# Patient Record
Sex: FEMALE | Race: WHITE | Employment: OTHER | URBAN - METROPOLITAN AREA
[De-identification: names, ages, dates, MRNs, and addresses within clinical notes are randomized per-mention and may not be internally consistent; named-entity substitution may affect disease eponyms.]

---

## 2017-08-16 ENCOUNTER — HOSPITAL ENCOUNTER (OUTPATIENT)
Dept: MAMMOGRAPHY | Facility: CLINIC | Age: 67
Discharge: HOME | End: 2017-08-16
Attending: OBSTETRICS & GYNECOLOGY | Admitting: OBSTETRICS & GYNECOLOGY
Payer: MEDICARE

## 2017-08-16 DIAGNOSIS — Z12.31 VISIT FOR SCREENING MAMMOGRAM: ICD-10-CM

## 2017-08-16 PROCEDURE — 77063 BREAST TOMOSYNTHESIS BI: CPT

## 2017-08-16 PROCEDURE — G0202 SCR MAMMO BI INCL CAD: HCPCS

## 2018-08-17 ENCOUNTER — HOSPITAL ENCOUNTER (OUTPATIENT)
Dept: MAMMOGRAPHY | Facility: CLINIC | Age: 68
Discharge: HOME | End: 2018-08-17
Attending: OBSTETRICS & GYNECOLOGY | Admitting: OBSTETRICS & GYNECOLOGY
Payer: MEDICARE

## 2018-08-17 DIAGNOSIS — Z12.31 VISIT FOR SCREENING MAMMOGRAM: ICD-10-CM

## 2018-08-17 PROCEDURE — 77067 SCR MAMMO BI INCL CAD: CPT

## 2019-08-19 ENCOUNTER — HOSPITAL ENCOUNTER (OUTPATIENT)
Dept: MAMMOGRAPHY | Facility: CLINIC | Age: 69
Discharge: HOME | End: 2019-08-19
Attending: OBSTETRICS & GYNECOLOGY | Admitting: OBSTETRICS & GYNECOLOGY
Payer: MEDICARE

## 2019-08-19 DIAGNOSIS — Z12.31 VISIT FOR SCREENING MAMMOGRAM: ICD-10-CM

## 2019-08-19 PROCEDURE — 77063 BREAST TOMOSYNTHESIS BI: CPT

## 2019-08-21 ENCOUNTER — TRANSFERRED RECORDS (OUTPATIENT)
Dept: HEALTH INFORMATION MANAGEMENT | Facility: CLINIC | Age: 69
End: 2019-08-21
Payer: MEDICARE

## 2020-09-02 ENCOUNTER — HOSPITAL ENCOUNTER (OUTPATIENT)
Dept: MAMMOGRAPHY | Facility: CLINIC | Age: 70
Discharge: HOME | End: 2020-09-02
Attending: OBSTETRICS & GYNECOLOGY | Admitting: OBSTETRICS & GYNECOLOGY
Payer: MEDICARE

## 2020-09-02 DIAGNOSIS — Z12.31 ENCOUNTER FOR SCREENING MAMMOGRAM FOR BREAST CANCER: ICD-10-CM

## 2020-09-02 PROCEDURE — 77067 SCR MAMMO BI INCL CAD: CPT

## 2021-01-15 ENCOUNTER — HEALTH MAINTENANCE LETTER (OUTPATIENT)
Age: 71
End: 2021-01-15

## 2021-08-09 LAB
GLUCOSE (EXTERNAL): 96 MG/DL (ref 70–110)
HBA1C MFR BLD: 5.4 % (ref 4.8–5.6)

## 2021-09-04 ENCOUNTER — HEALTH MAINTENANCE LETTER (OUTPATIENT)
Age: 71
End: 2021-09-04

## 2021-09-07 ENCOUNTER — HOSPITAL ENCOUNTER (OUTPATIENT)
Dept: MAMMOGRAPHY | Facility: CLINIC | Age: 71
Discharge: HOME | End: 2021-09-07
Attending: OBSTETRICS & GYNECOLOGY | Admitting: OBSTETRICS & GYNECOLOGY
Payer: MEDICARE

## 2021-09-07 DIAGNOSIS — Z12.31 VISIT FOR SCREENING MAMMOGRAM: ICD-10-CM

## 2021-09-07 PROCEDURE — 77063 BREAST TOMOSYNTHESIS BI: CPT

## 2021-09-15 ENCOUNTER — HOSPITAL ENCOUNTER (OUTPATIENT)
Dept: MAMMOGRAPHY | Facility: CLINIC | Age: 71
End: 2021-09-15
Attending: NURSE PRACTITIONER
Payer: MEDICARE

## 2021-09-15 DIAGNOSIS — R92.8 ABNORMAL MAMMOGRAM: ICD-10-CM

## 2021-09-15 PROCEDURE — 77061 BREAST TOMOSYNTHESIS UNI: CPT | Mod: LT

## 2021-09-15 PROCEDURE — 76642 ULTRASOUND BREAST LIMITED: CPT | Mod: LT

## 2021-10-11 ENCOUNTER — HOSPITAL ENCOUNTER (OUTPATIENT)
Dept: MAMMOGRAPHY | Facility: CLINIC | Age: 71
End: 2021-10-11
Attending: NURSE PRACTITIONER
Payer: MEDICARE

## 2021-10-11 DIAGNOSIS — R92.8 ABNORMAL MAMMOGRAM: ICD-10-CM

## 2021-10-11 PROCEDURE — 999N000065 MA POST PROCEDURE LEFT

## 2021-10-11 PROCEDURE — 88342 IMHCHEM/IMCYTCHM 1ST ANTB: CPT | Mod: TC,XU

## 2021-10-11 PROCEDURE — 272N000031 US BREAST BIOPSY CORE NEEDLE LEFT

## 2021-10-11 PROCEDURE — 250N000009 HC RX 250

## 2021-10-11 RX ORDER — HYDROXYZINE HYDROCHLORIDE 25 MG/1
TABLET, FILM COATED ORAL
COMMUNITY
Start: 2021-09-07 | End: 2021-11-03

## 2021-10-11 RX ORDER — PREDNISONE 20 MG/1
TABLET ORAL
COMMUNITY
Start: 2021-07-08 | End: 2021-11-03

## 2021-10-11 RX ORDER — CITALOPRAM HYDROBROMIDE 20 MG/1
20 TABLET ORAL DAILY
COMMUNITY
Start: 2021-09-23

## 2021-10-11 RX ORDER — TRAZODONE HYDROCHLORIDE 50 MG/1
TABLET, FILM COATED ORAL
COMMUNITY
Start: 2021-09-07 | End: 2021-11-15

## 2021-10-11 RX ORDER — DOXYCYCLINE HYCLATE 100 MG
TABLET ORAL
COMMUNITY
Start: 2021-08-02 | End: 2021-11-03

## 2021-10-11 RX ORDER — ROSUVASTATIN CALCIUM 5 MG/1
5 TABLET, COATED ORAL DAILY
COMMUNITY
Start: 2021-09-04

## 2021-10-11 RX ORDER — SUMATRIPTAN 100 MG/1
100 TABLET, FILM COATED ORAL
COMMUNITY
Start: 2021-08-04

## 2021-10-11 RX ADMIN — LIDOCAINE HYDROCHLORIDE 5 ML: 10 INJECTION, SOLUTION INFILTRATION; PERINEURAL at 10:00

## 2021-10-11 NOTE — DISCHARGE INSTRUCTIONS

## 2021-10-13 LAB
PATH REPORT.COMMENTS IMP SPEC: NORMAL
PATH REPORT.FINAL DX SPEC: NORMAL
PATH REPORT.GROSS SPEC: NORMAL
PATH REPORT.MICROSCOPIC SPEC OTHER STN: NORMAL
PATH REPORT.MICROSCOPIC SPEC OTHER STN: NORMAL
PATH REPORT.RELEVANT HX SPEC: NORMAL
PHOTO IMAGE: NORMAL

## 2021-10-13 PROCEDURE — 88342 IMHCHEM/IMCYTCHM 1ST ANTB: CPT | Mod: 26 | Performed by: PATHOLOGY

## 2021-10-13 PROCEDURE — 88305 TISSUE EXAM BY PATHOLOGIST: CPT | Mod: 26 | Performed by: PATHOLOGY

## 2021-10-13 PROCEDURE — 88341 IMHCHEM/IMCYTCHM EA ADD ANTB: CPT | Mod: 26 | Performed by: PATHOLOGY

## 2021-10-14 NOTE — PROGRESS NOTES
Atypical Path:  Pathology report reviewed with our breast radiologist Dr. Raymond Elaine, who confirmed the recent breast imaging is concordant with the final surgical pathology(10/11/21) results below.    I phoned Ms. Lydia Ac, confirmed her full name, date of birth, and notified patient of Ultrasound Guided Left Breast Biopsy results showing Complex Sclerosing Lesion and Atypical Ductal Hyperplasia(ADH).     Patient states no problems with biopsy site.  Recommended follow up is Surgical Consultation.  Patient states she will discuss with her OB/GYN, and then call Dr. Jennifer Rodriguez to schedule her surgical consult.     Questions were answered and she has my phone number if she has further questions.  Patient will call me back to inform once she is scheduled for Surgical Consultation with Dr. Rodriguez.     Patient verbalized understanding and agrees with the plan of care.  Ordering provider- Eliane Nguyen CNP has been notified of the results, and recommendations for follow up.  I will forward this note, along with the pathology results.   Farideh Gutierrez RN, BSN    Farideh Gutierrez RN, BSN  Breast Care Nurse Coordinator  [facility] [place]  [phone]      Lydia Ac [id number]  F, 1950  Surgical Pathology Report (Final result) [id number]  Authorizing Provider: Non-Fv Credentialed Provider,  Radiology  Ordering Provider: Non-Fv Credentialed Provider,  Radiology  Ordering Location: [facility]  Collected: 10/11/2021 10:00 AM  Pathologist: Kong Hannon MD Received: 10/11/2021 01:05 PM  .  Specimens  ID  Evan  ID Source Description Protocol Removed  A A Breast, Left Breast, Left Breast needle core biopsy  .  Final Diagnosis  A. Left breast needle biopsy:  - Complex sclerosing lesion with atypical ductal hyperplasia, usual type ductal hyperplasia, columnar cell change and  adenosis with focal  areas of microcalcifications.  Electronically signed by Kong Hannon MD on 10/13/2021 at 4:06 PM

## 2021-11-03 ENCOUNTER — OFFICE VISIT (OUTPATIENT)
Dept: SURGERY | Facility: CLINIC | Age: 71
End: 2021-11-03
Payer: MEDICARE

## 2021-11-03 ENCOUNTER — TELEPHONE (OUTPATIENT)
Dept: SURGERY | Facility: CLINIC | Age: 71
End: 2021-11-03

## 2021-11-03 VITALS
OXYGEN SATURATION: 98 % | SYSTOLIC BLOOD PRESSURE: 135 MMHG | RESPIRATION RATE: 16 BRPM | DIASTOLIC BLOOD PRESSURE: 77 MMHG | BODY MASS INDEX: 29.89 KG/M2 | HEART RATE: 78 BPM | HEIGHT: 66 IN | WEIGHT: 186 LBS

## 2021-11-03 DIAGNOSIS — N60.99 ATYPICAL DUCTAL HYPERPLASIA OF BREAST: Primary | ICD-10-CM

## 2021-11-03 DIAGNOSIS — Z11.59 ENCOUNTER FOR SCREENING FOR OTHER VIRAL DISEASES: ICD-10-CM

## 2021-11-03 PROCEDURE — 99204 OFFICE O/P NEW MOD 45 MIN: CPT | Performed by: SURGERY

## 2021-11-03 ASSESSMENT — MIFFLIN-ST. JEOR: SCORE: 1380.44

## 2021-11-03 NOTE — PROGRESS NOTES
Breast Patients      BREAST PATIENTS (FEMALE)    At what age did your periods begin? 14    What was the date of your last menstrual period? 30yrs ago    Have you begun menopause? Yes  Age Menopause began:  51    Are you using hormone replacement therapy?  No    Number of full-term pregnancies: 3    Did you nurse your children? Yes    Are you pregnant now? No    Do you have breast implants? No         BREAST PATIENTS (ALL)    Have you had a previous breast biopsy? Yes  Side: R  Date: 11/2009    Have you had previous Breast Cancer? No

## 2021-11-03 NOTE — PROGRESS NOTES
HPI:  Lydia Ac is a 71 year old female referred to me by Dr Clarke for consultation regarding a new left breast mammographic finding.  Lydia had an abnormality found on a routine screening mammogram.  She reports that the finding is non-palpable since it was first discovered.   She reports no symptoms in either the affected breast or in the contralateral, right breast.      Her risk factors for breast cancer include:  Personal history of atypical ductal hyperplasia in the right breast in 2009.  Positive family history in mother  at age 70s.  Maternal aunt and grandmother as with breast cancer in their later years.  No hormone therapy done for prior atypical ductal hyperplasia diagnosis.      Past Medical History:   has no past medical history on file.     Smoking History:  has never smoked.      Past Surgical History:  Past Surgical History:   Procedure Laterality Date     BREAST SURGERY          ROS:  10 point ROS is negative except as stated in the History of the Present Illness and joint pain and headache.    PE:  Vitals: There were no vitals taken for this visit.  General appearance: well-nourished, sitting comfortably, no apparent distress  Neck: Supple   Respirations:  Unlabored  Extremities: Without edema  Neurologic: alert, speech is clear, moves all extremities with good strength  Psychiatric: Mood and affect are appropriate  Skin: Without lesions or rashes  Breast:   Appearance-  Symmetrical with no skin or nipple changes.  Contour is normal.  Fibrocystic tissue is mild and symmetrical.  Masses- none   Lymph:   No supraclavicular/infraclavicular adenopathy.                   Axilla: Palpable adenopathy: Right - None                                                                Left - None  Imaging:  All films personally reviewed with Ldyia   Mammography reveals:  Scattered density.  Left breast with an ill defined architectural distortion in theleft breast, 12:00, mid depth.    US reveals:     Targeted ultrasound evaluation of the left breast at 12:00 2 cm from  the nipple demonstrates an irregular hypoechoic area measuring 0.8 x  1.0 x 0.7 cm, corresponding with the mammographic finding.     Pathology:  Percutaneous core needle biopsy reveals:   A.  Left breast needle biopsy:  - Complex sclerosing lesion with atypical ductal hyperplasia, usual type ductal hyperplasia, columnar cell change and adenosis with focal areas of microcalcifications..      Assessment:     Lydia is a 71 year old female with a Left breast asymmetrical density at  12 o'clock and 2 cm from the nipple.  This is biopsy proven atypical ductal hyperplasia with a sclerosing lesion    PLAN:  Recommend seed localized breast biopsy to ensure we are not missing anything adjacent to the finding.      Jennifer Rodriguez MD    Please route or send letter to:  Primary Care Provider (PCP) and Referring Provider

## 2021-11-03 NOTE — LETTER
November 3, 2021    RE: Lydia Ac, : 1950      HPI:  Lydia Ac is a 71 year old female referred to me by Dr Clarke for consultation regarding a new left breast mammographic finding.  Lydia had an abnormality found on a routine screening mammogram.  She reports that the finding is non-palpable since it was first discovered.   She reports no symptoms in either the affected breast or in the contralateral, right breast.       Her risk factors for breast cancer include:  Personal history of atypical ductal hyperplasia in the right breast in .  Positive family history in mother  at age 70s.  Maternal aunt and grandmother as with breast cancer in their later years.  No hormone therapy done for prior atypical ductal hyperplasia diagnosis.      Past Medical History:  Has no past medical history on file.      Smoking History:  Has never smoked.      ROS:  10 point ROS is negative except as stated in the History of the Present Illness and joint pain and headache.     PE:  Vitals: There were no vitals taken for this visit.  General appearance: well-nourished, sitting comfortably, no apparent distress  Neck: Supple   Respirations:  Unlabored  Extremities: Without edema  Neurologic: alert, speech is clear, moves all extremities with good strength  Psychiatric: Mood and affect are appropriate  Skin: Without lesions or rashes  Breast:   Appearance-  Symmetrical with no skin or nipple changes.  Contour is normal.  Fibrocystic tissue is mild and symmetrical.  Masses- none   Lymph:   No supraclavicular/infraclavicular adenopathy.                   Axilla: Palpable adenopathy: Right - None                                                                Left - None  Imaging:  All films personally reviewed with Lydia   Mammography reveals:  Scattered density.  Left breast with an ill defined architectural distortion in theleft breast, 12:00, mid depth.     US reveals:    Targeted ultrasound evaluation of the  left breast at 12:00 2 cm from  the nipple demonstrates an irregular hypoechoic area measuring 0.8 x  1.0 x 0.7 cm, corresponding with the mammographic finding.      Pathology:  Percutaneous core needle biopsy reveals:   A.  Left breast needle biopsy:  - Complex sclerosing lesion with atypical ductal hyperplasia, usual type ductal hyperplasia, columnar cell change and adenosis with focal areas of microcalcifications..       Assessment:     Lydia is a 71 year old female with a Left breast asymmetrical density at  12 o'clock and 2 cm from the nipple.  This is biopsy proven atypical ductal hyperplasia with a sclerosing lesion     PLAN:  Recommend seed localized breast biopsy to ensure we are not missing anything adjacent to the finding.           Jennifer Rodriguez MD

## 2021-11-03 NOTE — TELEPHONE ENCOUNTER
Type of surgery: LEFT BREAST SEED LOCALIZED EXCISIONAL BIOPSY   Location of surgery: [facility]  Date and time of surgery: 11-22-21, 12 PM   Surgeon: DR. REYES   Pre-Op Appt Date:   Post-Op Appt Date:    Packet sent out: GIVEN TO PATIENT   Pre-cert/Authorization completed:  Not Applicable  Date: 11-3-21        LEFT BREAST SEED LOCALIZED EXCISIONAL BIOPSY  MAC   PT INST TO HAVE H&P WITH DR. GUTHRIE  60 MINS REQ  PA ASSIST NLG  ALW   L seed loc at 10 am  alw

## 2021-11-15 RX ORDER — TRAZODONE HYDROCHLORIDE 50 MG/1
50 TABLET, FILM COATED ORAL AT BEDTIME
COMMUNITY
End: 2024-06-25

## 2021-11-15 ASSESSMENT — MIFFLIN-ST. JEOR: SCORE: 1271.58

## 2021-11-19 ENCOUNTER — LAB (OUTPATIENT)
Dept: URGENT CARE | Facility: URGENT CARE | Age: 71
End: 2021-11-19
Attending: SURGERY
Payer: MEDICARE

## 2021-11-19 DIAGNOSIS — Z11.59 ENCOUNTER FOR SCREENING FOR OTHER VIRAL DISEASES: ICD-10-CM

## 2021-11-19 LAB — SARS-COV-2 RNA RESP QL NAA+PROBE: NEGATIVE

## 2021-11-19 PROCEDURE — U0005 INFEC AGEN DETEC AMPLI PROBE: HCPCS

## 2021-11-19 PROCEDURE — U0003 INFECTIOUS AGENT DETECTION BY NUCLEIC ACID (DNA OR RNA); SEVERE ACUTE RESPIRATORY SYNDROME CORONAVIRUS 2 (SARS-COV-2) (CORONAVIRUS DISEASE [COVID-19]), AMPLIFIED PROBE TECHNIQUE, MAKING USE OF HIGH THROUGHPUT TECHNOLOGIES AS DESCRIBED BY CMS-2020-01-R: HCPCS

## 2021-11-19 ASSESSMENT — MIFFLIN-ST. JEOR: SCORE: 1269.32

## 2021-11-22 ENCOUNTER — APPOINTMENT (OUTPATIENT)
Dept: MAMMOGRAPHY | Facility: CLINIC | Age: 71
End: 2021-11-22
Attending: SURGERY
Payer: MEDICARE

## 2021-11-22 ENCOUNTER — APPOINTMENT (OUTPATIENT)
Dept: SURGERY | Facility: PHYSICIAN GROUP | Age: 71
End: 2021-11-22
Payer: MEDICARE

## 2021-11-22 ENCOUNTER — ANESTHESIA EVENT (OUTPATIENT)
Dept: SURGERY | Facility: CLINIC | Age: 71
End: 2021-11-22
Payer: MEDICARE

## 2021-11-22 ENCOUNTER — HOSPITAL ENCOUNTER (OUTPATIENT)
Facility: CLINIC | Age: 71
Discharge: HOME | End: 2021-11-22
Attending: SURGERY | Admitting: SURGERY
Payer: MEDICARE

## 2021-11-22 ENCOUNTER — HOSPITAL ENCOUNTER (OUTPATIENT)
Dept: ULTRASOUND IMAGING | Facility: CLINIC | Age: 71
End: 2021-11-22
Attending: SURGERY | Admitting: SURGERY
Payer: MEDICARE

## 2021-11-22 ENCOUNTER — ANESTHESIA (OUTPATIENT)
Dept: SURGERY | Facility: CLINIC | Age: 71
End: 2021-11-22
Payer: MEDICARE

## 2021-11-22 ENCOUNTER — HOSPITAL ENCOUNTER (OUTPATIENT)
Dept: MAMMOGRAPHY | Facility: CLINIC | Age: 71
End: 2021-11-22
Attending: SURGERY | Admitting: SURGERY
Payer: MEDICARE

## 2021-11-22 VITALS
DIASTOLIC BLOOD PRESSURE: 80 MMHG | OXYGEN SATURATION: 99 % | TEMPERATURE: 97.3 F | BODY MASS INDEX: 33.47 KG/M2 | RESPIRATION RATE: 16 BRPM | WEIGHT: 177.3 LBS | HEART RATE: 62 BPM | HEIGHT: 61 IN | SYSTOLIC BLOOD PRESSURE: 130 MMHG

## 2021-11-22 DIAGNOSIS — N60.99 ATYPICAL DUCTAL HYPERPLASIA OF BREAST: ICD-10-CM

## 2021-11-22 PROCEDURE — 999N000104 MA BREAST SPECIMEN LEFT OR

## 2021-11-22 PROCEDURE — 370N000017 HC ANESTHESIA TECHNICAL FEE, PER MIN: Performed by: SURGERY

## 2021-11-22 PROCEDURE — 999N000065 MA POST PROCEDURE LEFT

## 2021-11-22 PROCEDURE — 88360 TUMOR IMMUNOHISTOCHEM/MANUAL: CPT | Mod: TC | Performed by: SURGERY

## 2021-11-22 PROCEDURE — 272N000001 HC OR GENERAL SUPPLY STERILE: Performed by: SURGERY

## 2021-11-22 PROCEDURE — 250N000011 HC RX IP 250 OP 636: Performed by: SURGERY

## 2021-11-22 PROCEDURE — 258N000003 HC RX IP 258 OP 636: Performed by: ANESTHESIOLOGY

## 2021-11-22 PROCEDURE — 250N000009 HC RX 250: Performed by: RADIOLOGY

## 2021-11-22 PROCEDURE — 250N000011 HC RX IP 250 OP 636: Performed by: NURSE ANESTHETIST, CERTIFIED REGISTERED

## 2021-11-22 PROCEDURE — 250N000009 HC RX 250: Performed by: SURGERY

## 2021-11-22 PROCEDURE — A4648 IMPLANTABLE TISSUE MARKER: HCPCS

## 2021-11-22 PROCEDURE — 19285 PERQ DEV BREAST 1ST US IMAG: CPT | Mod: LT

## 2021-11-22 PROCEDURE — 710N000012 HC RECOVERY PHASE 2, PER MINUTE: Performed by: SURGERY

## 2021-11-22 PROCEDURE — 360N000083 HC SURGERY LEVEL 3 W/ FLUORO, PER MIN: Performed by: SURGERY

## 2021-11-22 PROCEDURE — 999N000141 HC STATISTIC PRE-PROCEDURE NURSING ASSESSMENT: Performed by: SURGERY

## 2021-11-22 PROCEDURE — 19125 EXCISION BREAST LESION: CPT | Mod: LT | Performed by: SURGERY

## 2021-11-22 PROCEDURE — 250N000009 HC RX 250: Performed by: NURSE ANESTHETIST, CERTIFIED REGISTERED

## 2021-11-22 PROCEDURE — 88342 IMHCHEM/IMCYTCHM 1ST ANTB: CPT | Mod: TC,XU | Performed by: SURGERY

## 2021-11-22 RX ORDER — HYDROCODONE BITARTRATE AND ACETAMINOPHEN 5; 325 MG/1; MG/1
1-2 TABLET ORAL
Status: DISCONTINUED | OUTPATIENT
Start: 2021-11-22 | End: 2021-11-22 | Stop reason: HOSPADM

## 2021-11-22 RX ORDER — FENTANYL CITRATE 50 UG/ML
INJECTION, SOLUTION INTRAMUSCULAR; INTRAVENOUS PRN
Status: DISCONTINUED | OUTPATIENT
Start: 2021-11-22 | End: 2021-11-22

## 2021-11-22 RX ORDER — HYDROCODONE BITARTRATE AND ACETAMINOPHEN 5; 325 MG/1; MG/1
1-2 TABLET ORAL EVERY 4 HOURS PRN
Qty: 15 TABLET | Refills: 0 | Status: SHIPPED | OUTPATIENT
Start: 2021-11-22 | End: 2021-12-13

## 2021-11-22 RX ORDER — LIDOCAINE HYDROCHLORIDE 10 MG/ML
INJECTION, SOLUTION INFILTRATION; PERINEURAL PRN
Status: DISCONTINUED | OUTPATIENT
Start: 2021-11-22 | End: 2021-11-22

## 2021-11-22 RX ORDER — ONDANSETRON 2 MG/ML
INJECTION INTRAMUSCULAR; INTRAVENOUS PRN
Status: DISCONTINUED | OUTPATIENT
Start: 2021-11-22 | End: 2021-11-22

## 2021-11-22 RX ORDER — CEFAZOLIN SODIUM/WATER 2 G/20 ML
2 SYRINGE (ML) INTRAVENOUS SEE ADMIN INSTRUCTIONS
Status: DISCONTINUED | OUTPATIENT
Start: 2021-11-22 | End: 2021-11-22 | Stop reason: HOSPADM

## 2021-11-22 RX ORDER — PROPOFOL 10 MG/ML
INJECTION, EMULSION INTRAVENOUS CONTINUOUS PRN
Status: DISCONTINUED | OUTPATIENT
Start: 2021-11-22 | End: 2021-11-22

## 2021-11-22 RX ORDER — CEFAZOLIN SODIUM/WATER 2 G/20 ML
2 SYRINGE (ML) INTRAVENOUS
Status: COMPLETED | OUTPATIENT
Start: 2021-11-22 | End: 2021-11-22

## 2021-11-22 RX ORDER — MAGNESIUM HYDROXIDE 1200 MG/15ML
LIQUID ORAL PRN
Status: DISCONTINUED | OUTPATIENT
Start: 2021-11-22 | End: 2021-11-22 | Stop reason: HOSPADM

## 2021-11-22 RX ORDER — SODIUM CHLORIDE, SODIUM LACTATE, POTASSIUM CHLORIDE, CALCIUM CHLORIDE 600; 310; 30; 20 MG/100ML; MG/100ML; MG/100ML; MG/100ML
INJECTION, SOLUTION INTRAVENOUS CONTINUOUS
Status: DISCONTINUED | OUTPATIENT
Start: 2021-11-22 | End: 2021-11-22 | Stop reason: HOSPADM

## 2021-11-22 RX ORDER — LIDOCAINE 40 MG/G
CREAM TOPICAL
Status: DISCONTINUED | OUTPATIENT
Start: 2021-11-22 | End: 2021-11-22 | Stop reason: HOSPADM

## 2021-11-22 RX ADMIN — Medication 2 G: at 13:07

## 2021-11-22 RX ADMIN — LIDOCAINE HYDROCHLORIDE 5 ML: 10 INJECTION, SOLUTION EPIDURAL; INFILTRATION; INTRACAUDAL; PERINEURAL at 10:26

## 2021-11-22 RX ADMIN — FENTANYL CITRATE 50 MCG: 50 INJECTION, SOLUTION INTRAMUSCULAR; INTRAVENOUS at 13:46

## 2021-11-22 RX ADMIN — ONDANSETRON HYDROCHLORIDE 4 MG: 2 INJECTION, SOLUTION INTRAVENOUS at 13:50

## 2021-11-22 RX ADMIN — LIDOCAINE HYDROCHLORIDE 20 MG: 10 INJECTION, SOLUTION INFILTRATION; PERINEURAL at 13:15

## 2021-11-22 RX ADMIN — SODIUM CHLORIDE, POTASSIUM CHLORIDE, SODIUM LACTATE AND CALCIUM CHLORIDE: 600; 310; 30; 20 INJECTION, SOLUTION INTRAVENOUS at 09:23

## 2021-11-22 RX ADMIN — PROPOFOL 200 MCG/KG/MIN: 10 INJECTION, EMULSION INTRAVENOUS at 13:13

## 2021-11-22 RX ADMIN — FENTANYL CITRATE 50 MCG: 50 INJECTION, SOLUTION INTRAMUSCULAR; INTRAVENOUS at 13:17

## 2021-11-22 ASSESSMENT — MIFFLIN-ST. JEOR: SCORE: 1261.61

## 2021-11-22 NOTE — ANESTHESIA PREPROCEDURE EVALUATION
Anesthesia Pre-Procedure Evaluation    Patient: Lydia Ac   MRN: [id number] : 1950        Preoperative Diagnosis: Atypical ductal hyperplasia of breast [N60.99]    Procedure : Procedure(s):  LUMPECTOMY, BREAST, WITH RADIOACTIVE SEED LOCALIZATION left          Past Medical History:   Diagnosis Date     Anxiety      H/O hyperlipidemia      Hx of migraine headaches      Left bundle branch block       Past Surgical History:   Procedure Laterality Date     BREAST SURGERY      biopsy x3 (benign)     ORTHOPEDIC SURGERY Left     bunionectomy     right rotator cuff repair        No Known Allergies   Social History     Tobacco Use     Smoking status: Never Smoker     Smokeless tobacco: Never Used   Substance Use Topics     Alcohol use: Yes     Comment: occasional       Wt Readings from Last 1 Encounters:   21 80.4 kg (177 lb 4.8 oz)        Anesthesia Evaluation   Pt has had prior anesthetic. Type: General.    No history of anesthetic complications       ROS/MED HX  ENT/Pulmonary:  - neg pulmonary ROS     Neurologic:  - neg neurologic ROS     Cardiovascular:  - neg cardiovascular ROS   (+) Dyslipidemia -----    METS/Exercise Tolerance:     Hematologic:  - neg hematologic  ROS     Musculoskeletal:   (+) arthritis,     GI/Hepatic:  - neg GI/hepatic ROS     Renal/Genitourinary:  - neg Renal ROS     Endo:  - neg endo ROS     Psychiatric/Substance Use:     (+) psychiatric history anxiety     Infectious Disease:  - neg infectious disease ROS     Malignancy:       Other:            Physical Exam    Airway        Mallampati: II   TM distance: > 3 FB   Neck ROM: full   Mouth opening: > 3 cm    Respiratory Devices and Support         Dental       (+) missing      Cardiovascular   cardiovascular exam normal          Pulmonary   pulmonary exam normal                OUTSIDE LABS:  CBC: No results found for: WBC, HGB, HCT, PLT  BMP: No results found for: NA, POTASSIUM, CHLORIDE, CO2, BUN, CR, GLC  COAGS: No results  found for: PTT, INR, FIBR  POC: No results found for: BGM, HCG, HCGS  HEPATIC: No results found for: ALBUMIN, PROTTOTAL, ALT, AST, GGT, ALKPHOS, BILITOTAL, BILIDIRECT, ACE  OTHER: No results found for: PH, LACT, A1C, WALLY, PHOS, MAG, LIPASE, AMYLASE, TSH, T4, T3, CRP, SED    Anesthesia Plan    ASA Status:  2      Anesthesia Type: MAC.     - Reason for MAC: straight local not clinically adequate              Consents    Anesthesia Plan(s) and associated risks, benefits, and realistic alternatives discussed. Questions answered and patient/representative(s) expressed understanding.    - Discussed:     - Discussed with:  Patient      - Extended Intubation/Ventilatory Support Discussed: No.      - Patient is DNR/DNI Status: No    Use of blood products discussed: No .     Postoperative Care    Pain management: Oral pain medications, IV analgesics.   PONV prophylaxis: Ondansetron (or other 5HT-3), Dexamethasone or Solumedrol     Comments:                Morales Dumont MD

## 2021-11-22 NOTE — ANESTHESIA POSTPROCEDURE EVALUATION
Patient: Lydia Ac    Procedure: Procedure(s):  LUMPECTOMY, BREAST, WITH RADIOACTIVE SEED LOCALIZATION left       Diagnosis:Atypical ductal hyperplasia of breast [N60.99]  Diagnosis Additional Information: No value filed.    Anesthesia Type:  MAC    Note:  Disposition: Outpatient   Postop Pain Control: Uneventful            Sign Out: Well controlled pain   PONV: No   Neuro/Psych: Uneventful            Sign Out: Acceptable/Baseline neuro status   Airway/Respiratory: Uneventful            Sign Out: Acceptable/Baseline resp. status   CV/Hemodynamics: Uneventful            Sign Out: Acceptable CV status; No obvious hypovolemia; No obvious fluid overload   Other NRE: NONE   DID A NON-ROUTINE EVENT OCCUR? No           Last vitals:  Vitals Value Taken Time   /63 11/22/21 1400   Temp 97.3  F (36.3  C) 11/22/21 1400   Pulse 65 11/22/21 1400   Resp 16 11/22/21 1400   SpO2 95 % 11/22/21 1400       Electronically Signed By: Morales Dumont MD  November 22, 2021  2:26 PM

## 2021-11-22 NOTE — ANESTHESIA CARE TRANSFER NOTE
Patient: Lydia Ac    Procedure: Procedure(s):  LUMPECTOMY, BREAST, WITH RADIOACTIVE SEED LOCALIZATION left       Diagnosis: Atypical ductal hyperplasia of breast [N60.99]  Diagnosis Additional Information: No value filed.    Anesthesia Type:   MAC     Note:    Oropharynx: oropharynx clear of all foreign objects  Level of Consciousness: awake  Oxygen Supplementation: room air    Independent Airway: airway patency satisfactory and stable  Dentition: dentition unchanged  Vital Signs Stable: post-procedure vital signs reviewed and stable  Report to RN Given: handoff report given  Patient transferred to: Phase II    Handoff Report: Identifed the Patient, Identified the Reponsible Provider, Reviewed the pertinent medical history, Discussed the surgical course, Reviewed Intra-OP anesthesia mangement and issues during anesthesia, Set expectations for post-procedure period and Allowed opportunity for questions and acknowledgement of understanding      Vitals:  Vitals Value Taken Time   BP     Temp     Pulse     Resp     SpO2         Electronically Signed By: AVELINO Munoz CRNA  November 22, 2021  2:02 PM  
No

## 2021-11-22 NOTE — OP NOTE
PREOPERATIVE DIAGNOSIS: Left breast atypical ductal hyperplasia and complex sclerosing lesion.   POSTOPERATIVE DIAGNOSIS: Left breast atypical ductal hyperplasia and complex sclerosing lesion.   PROCEDURE: Left excisional breast biopsy with seed localization.   ANESTHESIA: Local with MAC.   PREOPERATIVE MEDICATIONS: Ancef 2 gm IV.   SURGEON: Jennifer Rodriguez MD   ASSISTANT: Mahnaz Philip PA-C  - the physician assistant was medically necessary in providing adequate exposure in the operating field, maintaining hemostasis, cutting suture, clamping and ligating blood vessels, and visualization of the anatomic structures throughout the surgical procedure.   INDICATIONS: Lydia Ac is a 71 year old female was found to have a left breast mammographic finding on recent mammography. The area has been biopsied and shows atypical ductal hyperplasia.  Wider excision is recommended to ensure no adjacent more concerning finding.  The localization seed is adjacent to the lesion and clip.  She presents now for excisional biopsy.   PROCEDURE: The patient was placed supine. Left breast prepped and draped in usual sterile fashion. Local anesthetic was obtained with 1% lidocaine and 0.25% Marcaine plain. Curvilinear incision was made directly over the area of increased counts.  The dissection was deepened, using the radioactive seed to determine the direction of the dissection.  Once we are around and deep to the area of increased counts, the specimen is removed from the breast.  The gamma probe confirms that the seed is now in the specimen and not the breast.  Grossly, the specimen is consistent with dense breast tissue. Palpation through the incision site found the remaining tissue to be  palpably normal. The site was then inspected for hemostasis, irrigated and closed using 3-0 subcuticular Monocryl. Sterile dressings were applied and the patient transferred to recovery in good condition.   ESTIMATED BLOOD LOSS: Less than  10 cc.   INTRAOPERATIVE FINDINGS: Specimen excised by specimen mammogram, pathology pending.   CHRISTOPHER REYES MD

## 2021-11-22 NOTE — PROGRESS NOTES
SBAR Seed Localization    SITUATION:  Patient to breast imaging center for imaging guided seed localizations before breast lumpectomy or excision biopsy without sentinel node injection.    BACKGROUND:  Breast imaging cancer, breast abnormality  Ordered procedure completed: Yes  Special needs identified: No     ASSESSMENT:  SBAR report called to patient care unit because of unexpected event in radiology: No  Allergies and medication list reviewed prior to procedure. Yes  Skin cleansed with ChloraPrep One-Step.  Anesthesia: approximately 5ml of 1% Lidocaine injection subcutaneous before seed insertion administered by the radiologist.   Gauze dressing over insertion site(s).  Post procedure mammogram completed: Yes    Patient tolerance: patient tolerated left breast ultrasound guided radioactive seed localization well.    RECOMMENDATIONS:  Patient transferred to Same Day Surgery in stable condition via wheelchair with Transport Services.    Please call [facility] [phone] if there are any questions.

## 2021-11-22 NOTE — DISCHARGE INSTRUCTIONS
GENERAL ANESTHESIA OR SEDATION ADULT DISCHARGE INSTRUCTIONS   SPECIAL PRECAUTIONS FOR 24 HOURS AFTER SURGERY    IT IS NOT UNUSUAL TO FEEL LIGHT-HEADED OR FAINT, UP TO 24 HOURS AFTER SURGERY OR WHILE TAKING PAIN MEDICATION.  IF YOU HAVE THESE SYMPTOMS; SIT FOR A FEW MINUTES BEFORE STANDING AND HAVE SOMEONE ASSIST YOU WHEN YOU GET UP TO WALK OR USE THE BATHROOM.    YOU SHOULD REST AND RELAX FOR THE NEXT 24 HOURS AND YOU MUST MAKE ARRANGEMENTS TO HAVE SOMEONE STAY WITH YOU FOR AT LEAST 24 HOURS AFTER YOUR DISCHARGE.  AVOID HAZARDOUS AND STRENUOUS ACTIVITIES.  DO NOT MAKE IMPORTANT DECISIONS FOR 24 HOURS.    DO NOT DRIVE ANY VEHICLE OR OPERATE MECHANICAL EQUIPMENT FOR 24 HOURS FOLLOWING THE END OF YOUR SURGERY.  EVEN THOUGH YOU MAY FEEL NORMAL, YOUR REACTIONS MAY BE AFFECTED BY THE MEDICATION YOU HAVE RECEIVED.    DO NOT DRINK ALCOHOLIC BEVERAGES FOR 24 HOURS FOLLOWING YOUR SURGERY.    DRINK CLEAR LIQUIDS (APPLE JUICE, GINGER ALE, 7-UP, BROTH, ETC.).  PROGRESS TO YOUR REGULAR DIET AS YOU FEEL ABLE.    YOU MAY HAVE A DRY MOUTH, A SORE THROAT, MUSCLES ACHES OR TROUBLE SLEEPING.  THESE SHOULD GO AWAY AFTER 24 HOURS.    CALL YOUR DOCTOR FOR ANY OF THE FOLLOWING:  SIGNS OF INFECTION (FEVER, GROWING TENDERNESS AT THE SURGERY SITE, A LARGE AMOUNT OF DRAINAGE OR BLEEDING, SEVERE PAIN, FOUL-SMELLING DRAINAGE, REDNESS OR SWELLING.    IT HAS BEEN OVER 8 TO 10 HOURS SINCE SURGERY AND YOU ARE STILL NOT ABLE TO URINATE (PASS WATER).       LUMPECTOMY DISCHARGE INSTRUCTIONS  GUY Jeff, NATALEE Rodriguez. HANK Estrada,   & MELVI Bernal    APPOINTMENT WITH YOUR SURGEON  ALL PATIENTS ARE TO SCHEDULE A POST-OP APPOINTMENT WITH THEIR GENERAL SURGEON.  ONCE YOU ARE HOME, CALL THE OFFICE TO SCHEDULE THE APPOINTMENT FOR 2-3 WEEKS FROM THE DATE OF YOUR SURGERY.  YOU MAY NEED TO BE SEEN SOONER IF YOU HAVE A DRAIN IN PLACE.    APPOINTMENTS TO SEE YOUR GENERAL SURGEON AT ANY OF OUR LOCATIONS CAN BE MADE BY CALLING:   #[phone].    YOU WILL RECEIVE A PHONE CALL FROM YOUR SURGEON WITH THE RESULTS.  YOU WILL BE ABLE TO ASK QUESTIONS AND RECEIVE MORE IN-DEPTH EXPLANATION AT YOUR POST-OP APPOINTMENT.  THIS APPOINTMENT WILL ALSO BE WHEN YOU DISCUSS FURTHER EVALUATION, TREATMENT AND REFERRAL TO ONCOLOGY AND/OR RADIATION ONCOLOGY IF THIS IS NECESSARY.  OCCASIONALLY SPECIAL TESTING MUST BE DONE ON THE SURGICAL SPECIMEN WHICH MAY DELAY THE POSTING OF YOUR FINAL PATHOLOGY REPORT.  YOU MAY CALL FOR YOUR FINAL PATHOLOGY REPORT AFTER 1 P.M. THREE WORKING DAYS AFTER SURGERY TO CHECK ON ITS STATUS.    SUPPORT  WEAR A BRA FOR FOR SUPPORT AND COMFORT FOR 3-7 DAYS, DAY AND NIGHT.     DRAIN  IF YOU HAVE DRAIN IN PLACE, YOU WILL NEED A SEPARATE APPOINTMENT WITH A NURSE IN THE OFFICE TO HAVE THIS REMOVED.  YOU WILL HAVE A FORM TO KEEP TRACK OF THE OUTPUT OF YOUR DRAIN.  WHEN THE OUTPUT REACHES A POINT WHERE THE TOTAL FOR A 24 HOUR PERIOD IS LESS THAN 30cc'S, YOU ARE READY TO HAVE THE DRAIN REMOVED.  AT THAT POINT YOU CAN CALL THE OFFICE AND TALK TO THE NURSE TO ARRANGE COMING INTO THE OFFICE TO HAVE THIS DONE.  IF YOU HAVE MORE THAN ONE DRAIN IN PLACE, THEY MAY NOT ALL BE REMOVED AT THE SAME TIME, AS THE OUTPUTS CAN BE VERY DIFFERENT FROM EACH.  THE NURSE WILL HELP YOU TO MANAGE THIS AND HELP DECIDE WHEN THE REMAINING DRAINS WILL BE TAKEN OUT.    INCISION CARE  -IF YOU HAVE A DRESSING IN PLACE, KEEP CLEAN AND DRY FOR 48 HOURS; YOU MAY REPLACE THE GAUZE IF IT BECOMES SOILED.  -AFTER 48 HOURS YOU MAY REMOVE THE DRESSING AND SHOWER.  DO NOT SUBMERSE INCISION IN WATER FOR 1 WEEK.  -IF YOU HAVE A DERMABOND DRESSING (A TYPE OF SKIN GLUE), YOU MAY SHOWER IMMEDIATELY.  -SUTURES WILL ABSORB AND DO NOT NEED TO BE REMOVED.  -IF PRESENT, LEAVE THE STERI-STRIPS (WHITE PAPER TAPES) IN PLACE FOR 14 DAYS AFTER SURGERY-IF PRESENT, LEAVE DERMABOND GLUE IN PLACE UNTIL IT WEARS/FLAKES OFF.  -YOU MAY EXPECT A SMALL AMOUNT OF DRAINAGE FROM YOUR INCISION.  -A  "LUMP/RIDGE UNDER THE INCISION IS NORMAL AND WILL GRADUALLY RESOLVE.    BATHING  YOU ARE ALLOWED TO BATH (SHALLOW WATER IN A TUB ONLY) OR SHOWER 24-48 HOURS AFTER YOUR SURGERY.  MILD SOAP IS OK TO USE NEAR THESE SITES.  WHEN BATHING, DO NOT ALLOW THE INCISION OR DRAIN SITE TO BECOME SUBMERSED IN WATER BECAUSE THIS MAY INCREASE THE RISK OF INFECTION.    ACTIVITIES  CAUTIOUSLY RESUME EXERCISE AND STRENUOUS ACTIVITIES LIKE JOGGING, TENNIS, AEROBICS, ETC.  ALSO, BE CAREFUL OF STRETCHING ACTIVITIES WITH THE OPERATIVE SIDE FOR TWO WEEKS.    IF YOU HAD A \"SENTINEL NODE BIOPSY\" AT THE TIME OF YOUR SURGERY:  YOU HAVE NO RESTRICTIONS IN ADDITION TO THOSE NOTED ABOVE.    IF YOU HAD AN \"AXILLARY NODE DISSECTION\" AT THE TIME OF YOUR SURGERY:  YOU ARE RECOMMENDED TO RESTRICT THE ACTIVITY OF THE ARM ON THE SIDE THE DISSECTION WAS DONE ON.  THIS MEANS:  NO REACHING OVERHEAD UNTIL CLEARED TO DO SO BY YOUR SURGEON, NO CARRYING WEIGHT ON THAT SIDE GREATER THAN A COUPLE POUNDS, NO INJECTIONS/VACCINATIONS/BLOOD SAMPLES FROM THAT SIDE, AND NO BLOOD PRESSURE MEASUREMENTS ON THAT SIDE.  IT IS ALSO RECOMMENDED TO ELEVATE THE ARM ON PILLOWS SEVERAL TIMES A DAY TO DECREASE/MINIMIZE SWELLING AND AVOID SLEEPING ON THAT ARM.    DIET  NO RESTRICTIONS.  INCREASED FLUID INTAKE IS RECOMMENDED.  WHILE TAKING PAIN MEDICATIONS, INCREASE DIETARY FIBER OR ADD A FIBER SUPPLEMENTATION LIKE METAMUCIL OR CITRUCEL TO HELP PREVENT CONSTIPATION - A POSSIBLE SIDE EFFECT OF PAIN MEDICATIONS.    DISCOMFORT  LOCAL ANESTHETIC PLACED AT SURGERY SHOULD PROVIDE RELIEF FOR 4-8 HOURS.  BEGIN TAKING PAIN PILLS BEFORE DISCOMFORT IS SEVERE.  TAKE THE PAIN MEDICATION WITH SOME FOOD, WHEN POSSIBLE, TO MINIMIZE SIDE EFFECTS.  INTERMITTENT USE OF ICE PACKS MAY HELP DURING THE FIRST 48 HOURS.  EXPECT GRADUAL IMPROVEMENT.    RETURN APPOINTMENT  SCHEDULE A FOLLOW-UP VISIT 2-3 WEEKS POST-OP.  OFFICE PHONE:  [phone]    CONTACT US IF THE FOLLOWING DEVELOPS  1.  A FEVER THAT IS " "ABOVE 101 DEGREES F.  2.  IF THERE IS A LARGE AMOUNT OF DRAINAGE, BLEEDING OR SWELLING.  3.  SEVERE PAIN THAT IS NOT RELIEVED BY YOUR PRESCRIPTION.  4.  DRAINAGE THAT IS THICK, CLOUDY, YELLOW, GREEN OR WHITE.  5.  ANY OTHER QUESTIONS NOT ANSWERED BY \"FREQUENTLY ASKED QUESTIONS\" SHEET.    Maximum acetaminophen (Tylenol) dose from all sources should not exceed 4 grams (4000 mg) per day.  Each norco contains 325mg of tylenol.            "

## 2021-11-26 ENCOUNTER — PREP FOR PROCEDURE (OUTPATIENT)
Dept: SURGERY | Facility: CLINIC | Age: 71
End: 2021-11-26

## 2021-11-26 DIAGNOSIS — D05.12 DUCTAL CARCINOMA IN SITU (DCIS) OF LEFT BREAST: Primary | ICD-10-CM

## 2021-11-26 LAB
PATH REPORT.COMMENTS IMP SPEC: NORMAL
PATH REPORT.COMMENTS IMP SPEC: NORMAL
PATH REPORT.FINAL DX SPEC: NORMAL
PATH REPORT.GROSS SPEC: NORMAL
PATH REPORT.MICROSCOPIC SPEC OTHER STN: NORMAL
PATH REPORT.MICROSCOPIC SPEC OTHER STN: NORMAL
PATH REPORT.RELEVANT HX SPEC: NORMAL
PATHOLOGY SYNOPTIC REPORT: NORMAL
PHOTO IMAGE: NORMAL

## 2021-11-26 PROCEDURE — 88307 TISSUE EXAM BY PATHOLOGIST: CPT | Mod: 26 | Performed by: PATHOLOGY

## 2021-11-26 PROCEDURE — 88360 TUMOR IMMUNOHISTOCHEM/MANUAL: CPT | Mod: 26 | Performed by: PATHOLOGY

## 2021-11-26 PROCEDURE — 88342 IMHCHEM/IMCYTCHM 1ST ANTB: CPT | Mod: 26 | Performed by: PATHOLOGY

## 2021-11-26 PROCEDURE — 88341 IMHCHEM/IMCYTCHM EA ADD ANTB: CPT | Mod: 26 | Performed by: PATHOLOGY

## 2021-11-29 DIAGNOSIS — Z11.59 ENCOUNTER FOR SCREENING FOR OTHER VIRAL DISEASES: ICD-10-CM

## 2021-12-01 ENCOUNTER — TELEPHONE (OUTPATIENT)
Dept: SURGERY | Facility: CLINIC | Age: 71
End: 2021-12-01
Payer: MEDICARE

## 2021-12-01 NOTE — TELEPHONE ENCOUNTER
Type of surgery: LEFT BREAST LUMPECTOMY     Location of surgery: [facility]  Date and time of surgery: 12/15/2021 @ 8:15 AM   Surgeon: CHRISTOPHER REYES MD   Pre-Op Appt Date: PATIENT TO SCHEDULE    Post-Op Appt Date: PATIENT TO SCHEDULE     Packet sent out: Yes  Pre-cert/Authorization completed:  Not Applicable  Date: 12/1/2021       LEFT BREAST LUMPECTOMY    MAC H&P DONE 11/15/2021 ( NLG WILL UPDATE IF NEEEDED) 60 MIN REQ PA ASSIST NLG NMS

## 2021-12-13 ENCOUNTER — LAB (OUTPATIENT)
Dept: URGENT CARE | Facility: URGENT CARE | Age: 71
End: 2021-12-13
Payer: MEDICARE

## 2021-12-13 DIAGNOSIS — Z11.59 ENCOUNTER FOR SCREENING FOR OTHER VIRAL DISEASES: ICD-10-CM

## 2021-12-13 LAB — SARS-COV-2 RNA RESP QL NAA+PROBE: NEGATIVE

## 2021-12-13 PROCEDURE — U0005 INFEC AGEN DETEC AMPLI PROBE: HCPCS

## 2021-12-13 PROCEDURE — U0003 INFECTIOUS AGENT DETECTION BY NUCLEIC ACID (DNA OR RNA); SEVERE ACUTE RESPIRATORY SYNDROME CORONAVIRUS 2 (SARS-COV-2) (CORONAVIRUS DISEASE [COVID-19]), AMPLIFIED PROBE TECHNIQUE, MAKING USE OF HIGH THROUGHPUT TECHNOLOGIES AS DESCRIBED BY CMS-2020-01-R: HCPCS

## 2021-12-15 ENCOUNTER — APPOINTMENT (OUTPATIENT)
Dept: SURGERY | Facility: PHYSICIAN GROUP | Age: 71
End: 2021-12-15
Payer: MEDICARE

## 2021-12-15 ENCOUNTER — ANESTHESIA (OUTPATIENT)
Dept: SURGERY | Facility: CLINIC | Age: 71
End: 2021-12-15
Payer: MEDICARE

## 2021-12-15 ENCOUNTER — HOSPITAL ENCOUNTER (OUTPATIENT)
Facility: CLINIC | Age: 71
Discharge: HOME | End: 2021-12-15
Attending: SURGERY | Admitting: SURGERY
Payer: MEDICARE

## 2021-12-15 ENCOUNTER — ANESTHESIA EVENT (OUTPATIENT)
Dept: SURGERY | Facility: CLINIC | Age: 71
End: 2021-12-15
Payer: MEDICARE

## 2021-12-15 VITALS
WEIGHT: 180 LBS | DIASTOLIC BLOOD PRESSURE: 88 MMHG | TEMPERATURE: 98 F | BODY MASS INDEX: 33.99 KG/M2 | HEIGHT: 61 IN | SYSTOLIC BLOOD PRESSURE: 140 MMHG | HEART RATE: 67 BPM | OXYGEN SATURATION: 96 % | RESPIRATION RATE: 12 BRPM

## 2021-12-15 DIAGNOSIS — Z12.39 BREAST CANCER SCREENING, HIGH RISK PATIENT: Primary | ICD-10-CM

## 2021-12-15 PROCEDURE — 710N000009 HC RECOVERY PHASE 1, LEVEL 1, PER MIN: Performed by: SURGERY

## 2021-12-15 PROCEDURE — 370N000017 HC ANESTHESIA TECHNICAL FEE, PER MIN: Performed by: SURGERY

## 2021-12-15 PROCEDURE — 360N000076 HC SURGERY LEVEL 3, PER MIN: Performed by: SURGERY

## 2021-12-15 PROCEDURE — 250N000011 HC RX IP 250 OP 636: Performed by: NURSE ANESTHETIST, CERTIFIED REGISTERED

## 2021-12-15 PROCEDURE — 272N000001 HC OR GENERAL SUPPLY STERILE: Performed by: SURGERY

## 2021-12-15 PROCEDURE — 999N000141 HC STATISTIC PRE-PROCEDURE NURSING ASSESSMENT: Performed by: SURGERY

## 2021-12-15 PROCEDURE — 710N000012 HC RECOVERY PHASE 2, PER MINUTE: Performed by: SURGERY

## 2021-12-15 PROCEDURE — 250N000009 HC RX 250: Performed by: SURGERY

## 2021-12-15 PROCEDURE — 88342 IMHCHEM/IMCYTCHM 1ST ANTB: CPT | Mod: TC | Performed by: SURGERY

## 2021-12-15 PROCEDURE — 19301 PARTIAL MASTECTOMY: CPT | Mod: LT | Performed by: SURGERY

## 2021-12-15 PROCEDURE — 250N000011 HC RX IP 250 OP 636: Performed by: SURGERY

## 2021-12-15 PROCEDURE — 250N000013 HC RX MED GY IP 250 OP 250 PS 637: Performed by: SURGERY

## 2021-12-15 PROCEDURE — 258N000003 HC RX IP 258 OP 636: Performed by: ANESTHESIOLOGY

## 2021-12-15 PROCEDURE — 250N000009 HC RX 250: Performed by: NURSE ANESTHETIST, CERTIFIED REGISTERED

## 2021-12-15 RX ORDER — CEFAZOLIN SODIUM/WATER 2 G/20 ML
2 SYRINGE (ML) INTRAVENOUS
Status: COMPLETED | OUTPATIENT
Start: 2021-12-15 | End: 2021-12-15

## 2021-12-15 RX ORDER — ONDANSETRON 2 MG/ML
4 INJECTION INTRAMUSCULAR; INTRAVENOUS EVERY 30 MIN PRN
Status: DISCONTINUED | OUTPATIENT
Start: 2021-12-15 | End: 2021-12-15 | Stop reason: HOSPADM

## 2021-12-15 RX ORDER — HYDROCODONE BITARTRATE AND ACETAMINOPHEN 5; 325 MG/1; MG/1
1-2 TABLET ORAL EVERY 4 HOURS PRN
Qty: 15 TABLET | Refills: 0 | Status: SHIPPED | OUTPATIENT
Start: 2021-12-15 | End: 2022-01-05

## 2021-12-15 RX ORDER — MAGNESIUM HYDROXIDE 1200 MG/15ML
LIQUID ORAL PRN
Status: DISCONTINUED | OUTPATIENT
Start: 2021-12-15 | End: 2021-12-15 | Stop reason: HOSPADM

## 2021-12-15 RX ORDER — HYDROMORPHONE HYDROCHLORIDE 1 MG/ML
0.5 INJECTION, SOLUTION INTRAMUSCULAR; INTRAVENOUS; SUBCUTANEOUS EVERY 5 MIN PRN
Status: DISCONTINUED | OUTPATIENT
Start: 2021-12-15 | End: 2021-12-15 | Stop reason: HOSPADM

## 2021-12-15 RX ORDER — PROPOFOL 10 MG/ML
INJECTION, EMULSION INTRAVENOUS CONTINUOUS PRN
Status: DISCONTINUED | OUTPATIENT
Start: 2021-12-15 | End: 2021-12-15

## 2021-12-15 RX ORDER — CEFAZOLIN SODIUM/WATER 2 G/20 ML
2 SYRINGE (ML) INTRAVENOUS SEE ADMIN INSTRUCTIONS
Status: DISCONTINUED | OUTPATIENT
Start: 2021-12-15 | End: 2021-12-15 | Stop reason: HOSPADM

## 2021-12-15 RX ORDER — SODIUM CHLORIDE, SODIUM LACTATE, POTASSIUM CHLORIDE, CALCIUM CHLORIDE 600; 310; 30; 20 MG/100ML; MG/100ML; MG/100ML; MG/100ML
INJECTION, SOLUTION INTRAVENOUS CONTINUOUS
Status: DISCONTINUED | OUTPATIENT
Start: 2021-12-15 | End: 2021-12-15 | Stop reason: HOSPADM

## 2021-12-15 RX ORDER — LIDOCAINE HYDROCHLORIDE 10 MG/ML
INJECTION, SOLUTION EPIDURAL; INFILTRATION; INTRACAUDAL; PERINEURAL PRN
Status: DISCONTINUED | OUTPATIENT
Start: 2021-12-15 | End: 2021-12-15

## 2021-12-15 RX ORDER — MEPERIDINE HYDROCHLORIDE 25 MG/ML
12.5 INJECTION INTRAMUSCULAR; INTRAVENOUS; SUBCUTANEOUS
Status: DISCONTINUED | OUTPATIENT
Start: 2021-12-15 | End: 2021-12-15 | Stop reason: HOSPADM

## 2021-12-15 RX ORDER — OXYCODONE HYDROCHLORIDE 5 MG/1
5 TABLET ORAL EVERY 4 HOURS PRN
Status: DISCONTINUED | OUTPATIENT
Start: 2021-12-15 | End: 2021-12-15 | Stop reason: HOSPADM

## 2021-12-15 RX ORDER — ACETAMINOPHEN 325 MG/1
650 TABLET ORAL
Status: DISCONTINUED | OUTPATIENT
Start: 2021-12-15 | End: 2021-12-15 | Stop reason: HOSPADM

## 2021-12-15 RX ORDER — LORAZEPAM 2 MG/ML
.5-1 INJECTION INTRAMUSCULAR
Status: DISCONTINUED | OUTPATIENT
Start: 2021-12-15 | End: 2021-12-15 | Stop reason: HOSPADM

## 2021-12-15 RX ORDER — FENTANYL CITRATE 50 UG/ML
50 INJECTION, SOLUTION INTRAMUSCULAR; INTRAVENOUS EVERY 5 MIN PRN
Status: DISCONTINUED | OUTPATIENT
Start: 2021-12-15 | End: 2021-12-15 | Stop reason: HOSPADM

## 2021-12-15 RX ORDER — BUPIVACAINE HYDROCHLORIDE 2.5 MG/ML
INJECTION, SOLUTION EPIDURAL; INFILTRATION; INTRACAUDAL PRN
Status: DISCONTINUED | OUTPATIENT
Start: 2021-12-15 | End: 2021-12-15 | Stop reason: HOSPADM

## 2021-12-15 RX ORDER — ONDANSETRON 2 MG/ML
INJECTION INTRAMUSCULAR; INTRAVENOUS PRN
Status: DISCONTINUED | OUTPATIENT
Start: 2021-12-15 | End: 2021-12-15

## 2021-12-15 RX ORDER — LIDOCAINE 40 MG/G
CREAM TOPICAL
Status: DISCONTINUED | OUTPATIENT
Start: 2021-12-15 | End: 2021-12-15 | Stop reason: HOSPADM

## 2021-12-15 RX ORDER — HYDRALAZINE HYDROCHLORIDE 20 MG/ML
5-10 INJECTION INTRAMUSCULAR; INTRAVENOUS EVERY 10 MIN PRN
Status: DISCONTINUED | OUTPATIENT
Start: 2021-12-15 | End: 2021-12-15 | Stop reason: HOSPADM

## 2021-12-15 RX ORDER — FENTANYL CITRATE 50 UG/ML
25 INJECTION, SOLUTION INTRAMUSCULAR; INTRAVENOUS
Status: DISCONTINUED | OUTPATIENT
Start: 2021-12-15 | End: 2021-12-15 | Stop reason: HOSPADM

## 2021-12-15 RX ORDER — ALBUTEROL SULFATE 0.83 MG/ML
2.5 SOLUTION RESPIRATORY (INHALATION) EVERY 4 HOURS PRN
Status: DISCONTINUED | OUTPATIENT
Start: 2021-12-15 | End: 2021-12-15 | Stop reason: HOSPADM

## 2021-12-15 RX ORDER — DEXAMETHASONE SODIUM PHOSPHATE 4 MG/ML
INJECTION, SOLUTION INTRA-ARTICULAR; INTRALESIONAL; INTRAMUSCULAR; INTRAVENOUS; SOFT TISSUE PRN
Status: DISCONTINUED | OUTPATIENT
Start: 2021-12-15 | End: 2021-12-15

## 2021-12-15 RX ORDER — FENTANYL CITRATE 50 UG/ML
INJECTION, SOLUTION INTRAMUSCULAR; INTRAVENOUS PRN
Status: DISCONTINUED | OUTPATIENT
Start: 2021-12-15 | End: 2021-12-15

## 2021-12-15 RX ORDER — ONDANSETRON 4 MG/1
4 TABLET, ORALLY DISINTEGRATING ORAL EVERY 30 MIN PRN
Status: DISCONTINUED | OUTPATIENT
Start: 2021-12-15 | End: 2021-12-15 | Stop reason: HOSPADM

## 2021-12-15 RX ORDER — HYDROCODONE BITARTRATE AND ACETAMINOPHEN 5; 325 MG/1; MG/1
2 TABLET ORAL
Status: COMPLETED | OUTPATIENT
Start: 2021-12-15 | End: 2021-12-15

## 2021-12-15 RX ORDER — PROPOFOL 10 MG/ML
INJECTION, EMULSION INTRAVENOUS PRN
Status: DISCONTINUED | OUTPATIENT
Start: 2021-12-15 | End: 2021-12-15

## 2021-12-15 RX ORDER — LABETALOL HYDROCHLORIDE 5 MG/ML
10 INJECTION, SOLUTION INTRAVENOUS
Status: DISCONTINUED | OUTPATIENT
Start: 2021-12-15 | End: 2021-12-15 | Stop reason: HOSPADM

## 2021-12-15 RX ADMIN — FENTANYL CITRATE 50 MCG: 50 INJECTION, SOLUTION INTRAMUSCULAR; INTRAVENOUS at 08:28

## 2021-12-15 RX ADMIN — HYDROMORPHONE HYDROCHLORIDE 0.5 MG: 1 INJECTION, SOLUTION INTRAMUSCULAR; INTRAVENOUS; SUBCUTANEOUS at 08:53

## 2021-12-15 RX ADMIN — PROPOFOL 75 MCG/KG/MIN: 10 INJECTION, EMULSION INTRAVENOUS at 08:34

## 2021-12-15 RX ADMIN — SODIUM CHLORIDE, POTASSIUM CHLORIDE, SODIUM LACTATE AND CALCIUM CHLORIDE: 600; 310; 30; 20 INJECTION, SOLUTION INTRAVENOUS at 07:45

## 2021-12-15 RX ADMIN — DEXAMETHASONE SODIUM PHOSPHATE 4 MG: 4 INJECTION, SOLUTION INTRA-ARTICULAR; INTRALESIONAL; INTRAMUSCULAR; INTRAVENOUS; SOFT TISSUE at 08:28

## 2021-12-15 RX ADMIN — LIDOCAINE HYDROCHLORIDE 50 MG: 10 INJECTION, SOLUTION EPIDURAL; INFILTRATION; INTRACAUDAL; PERINEURAL at 08:28

## 2021-12-15 RX ADMIN — ONDANSETRON HYDROCHLORIDE 4 MG: 2 INJECTION, SOLUTION INTRAVENOUS at 09:21

## 2021-12-15 RX ADMIN — PROPOFOL 120 MG: 10 INJECTION, EMULSION INTRAVENOUS at 08:28

## 2021-12-15 RX ADMIN — HYDROCODONE BITARTRATE AND ACETAMINOPHEN 2 TABLET: 5; 325 TABLET ORAL at 11:05

## 2021-12-15 RX ADMIN — Medication 2 G: at 08:20

## 2021-12-15 RX ADMIN — PROPOFOL 80 MG: 10 INJECTION, EMULSION INTRAVENOUS at 08:31

## 2021-12-15 RX ADMIN — SODIUM CHLORIDE, POTASSIUM CHLORIDE, SODIUM LACTATE AND CALCIUM CHLORIDE: 600; 310; 30; 20 INJECTION, SOLUTION INTRAVENOUS at 10:09

## 2021-12-15 ASSESSMENT — MIFFLIN-ST. JEOR: SCORE: 1273.85

## 2021-12-15 NOTE — DISCHARGE INSTRUCTIONS
HOME CARE FOLLOWING LUMPECTOMY  GUY Jeff, LORRAINE Way R. O Donnell, J. Shaheen    APPOINTMENT WITH YOUR SURGEON:  All patients are to schedule a post-op appointment with their general surgeon.  Once you are home, call the office to schedule the appointment for 2-3 weeks from the date of your surgery.  You may need to be seen sooner if you have a drain in place.      Appointments to see your general surgeon at any of our locations can be made by calling:  #[phone]    You will receive a phone call from your surgeon with these results.  You will be able to ask questions and receive more in-depth explanation at your post-op appointment.  This appointment will also be when you discuss further evaluation, treatment, and referral to oncology and/or radiation oncology if this is necessary.  Occasionally special testing must be done on the surgical specimen which may delay the posting of your final pathology report.  You may call for your final pathology report after 1p.m. three working days after surgery to check on its status.    SUPPORT:  Wear a bra for support and comfort for 3-7 days, day and night.    DRAIN:  If you have a drain in place, you will need a separate appointment with a nurse in the office to have this removed.  You will have a form to keep track of the output of your drain.  When the output reaches a point where the total for a 24 hour period is less than 30cc s, you are ready to have the drain removed.  At that point you can call the office and talk to the nurse to arrange coming into the office to have this done.  If you have more than one drain in place, they may not all be removed at the same time, as the outputs can be very different from each.  The nurse will help you to manage this and help decide when the remaining drains will be taken out.    INCISIONAL CARE:    If you have a dressing in place, keep clean and dry for 48 hours; you may replace the gauze if it  becomes soiled.    After 48 hours you may remove the dressing and shower.  Do not submerse incision in water for 1 week.    If you have a Dermabond dressing (a type of skin glue), you may shower immediately.    Sutures will absorb and do not need to be removed.    If present, leave the steri-strips (white paper tapes) in place for 14 days after surgery.    If present, leave Dermabond glue in place until it wears/flakes off.    You may expect a small amount of drainage from your incision.    A lump/ridge under the incision is normal and will gradually resolve.    BATHING:  You are allowed to bath (shallow water in a tub only) or shower 24-48 hours after your surgery.  Mild soap is OK to use near these sites.  When bathing, do not allow the incision or drain site to become submersed in water as this may increase the risk of infection.    ACTIVITY:  Cautiously resume exercise and strenuous activities such as jogging, tennis, aerobics, etc. Also, be careful of stretching activities with operative side for two weeks.    If you had a  sentinel node biopsy  at the time of your surgery:  You have no restrictions in addition to those noted above.    If you had an  axillary node dissection  at the time of your surgery:  You are recommended to restrict the activity of the arm on the side the dissection was done on.  This means:  no reaching overhead until cleared to do so by your surgeon, no carrying weight on that side greater than a couple pounds, no injections/vaccinations/ blood samples from that side, and no blood pressure measurements on that side.  It is also recommended to elevate the arm on pillows several times a day to decrease/minimize swelling, and avoid sleeping on that arm.    DIET:  No restrictions.  Increased fluid intake is recommended. While taking pain medications, increase dietary fiber or add a fiber supplementation like Metamucil or Citrucel to help prevent constipation - a possible side effect of pain  medications.    DISCOMFORT:  Local anesthetic placed at surgery should provide relief for 4-8 hours.  Begin taking pain pills before discomfort is severe.  Take the pain medication with some food, when possible, to minimize side effects.  Intermittent use of ice packs may help during the first 48 hours.  Expect gradual improvement.    RETURN APPOINTMENT:  Schedule a follow-up visit 2-3 weeks post-op.  Office Phone:  [phone]     CONTACT US IF THE FOLLOWING DEVELOPS:   1. A fever that is above 101     2. If there is a large amount of drainage, bleeding, or swelling.   3. Severe pain that is not relieved by your prescription.   4. Drainage that is thick, cloudy, yellow, green or white.   5. Any other questions not answered by  Frequently Asked Questions  sheet.      FREQUENTLY ASKED QUESTIONS:    Q:  How should my incision look?    A:  Normally your incision will appear slightly swollen with light redness directly along the incision itself as it heals.  It may feel like a bump or ridge as the healing/scarring happens, and over time (3-4 months) this bump or ridge feeling should slowly go away.  In general, clear or pink watery drainage can be normal at first as your incision heals, but should decrease over time.    Q:  How do I know if my incision is infected?  A:  Look at your incision for signs of infection, like redness around the incision spreading to surrounding skin, or drainage of cloudy or foul-smelling drainage.  If you feel warm, check your temperature to see if you are running a fever.    **If any of these things occur, please notify the nurse at our office.  We may need you to come into the office for an incision check.      Q:  How do I take care of my incision?  A:  If you have a dressing in place - Starting the day after surgery, replace the dressing 1-2 times a day until there is no further drainage from the incision.  At that time, a dressing is no longer needed.  Try to minimize tape on the skin  if irritation is occurring at the tape sites.  If you have significant irritation from tape on the skin, please call the office to discuss other method of dressing your incision.    Small pieces of tape called  steri-strips  may be present directly overlying your incision; these may be removed 10 days after surgery unless otherwise specified by your surgeon.  If these tapes start to loosen at the ends, you may trim them back until they fall off or are removed.    A:  If you had  Dermabond  tissue glue used as a dressing (this causes your incision to look shiny with a clear covering over it) - This type of dressing wears off with time and does not require more dressings over the top unless it is draining around the glue as it wears off.  Do not apply ointments or lotions over the incisions until the glue has completely worn off.    Q:  There is a piece of tape or a sticky  lead  still on my skin.  Can I remove this?  A:  Sometimes the sticky  leads  used for monitoring during surgery or for evaluation in the emergency department are not all removed while you are in the hospital.  These sometimes have a tab or metal dot on them.  You can easily remove these on your own, like taking off a band-aid.  If there is a gel substance under the  lead , simply wipe/clean it off with a washcloth or paper towel.      Q:  What can I do to minimize constipation (very hard stools, or lack of stools)?  A:  Stay well hydrated.  Increase your dietary fiber intake or take a fiber supplement -with plenty of water.  Walk around frequently.  You may consider an over-the-counter stool-softener.  Your Pharmacist can assist you with choosing one that is stocked at your pharmacy.  Constipation is also one of the most common side effects of pain medication.  If you are using pain medication, be pro-active and try to PREVENT problems with constipation by taking the steps above BEFORE constipation becomes a problem.    Q:  What do I do if I need  more pain medications?  A:  Call the office to receive refills.  Be aware that certain pain meds cannot be called into a pharmacy and actually require a paper prescription.  A change may be made in your pain med as you progress thru your recovery period or if you have side effects to certain meds.    --Pain meds are NOT refilled after 5pm on weekdays, and NOT AT ALL on the weekends, so please look ahead to prevent problems.      Q:  Why am I having a hard time sleeping now that I am at home?  A:  Many medications you receive while you are in the hospital can impact your sleep for a number of days after your surgery/hospitalization.  Decreased level of activity and naps during the day may also make sleeping at night difficult.  Try to minimize day-time naps, and get up frequently during the day to walk around your home during your recovery time.  Sleep aides may be of some help, but are not recommended for long-term use.      Q:  I am having some back discomfort.  What should I do?  A:  This may be related to certain positioning that was required for your surgery, extended periods of time in bed, or other changes in your overall activity level.  You may try ice, heat, acetaminophen, or ibuprofen to treat this temporarily.  Note that many pain medications have acetaminophen in them and would state this on the prescription bottle.  Be sure not to exceed the maximum of 4000mg per day of acetaminophen.     **If the pain you are having does not resolve, is severe, or is a flare of back pain you have had on other occasions prior to surgery, please contact your primary physician for further recommendations or for an appointment to be examined at their office.    Q:  Why am I having headaches?  A:  Headaches can be caused by many things:  caffeine withdrawal, use of pain meds, dehydration, high blood pressure, lack of sleep, over-activity/exhaustion, flare-up of usual migraine headaches.  If you feel this is related to  muscle tension (a band-like feeling around the head, or a pressure at the low-back of the head) you may try ice or heat to this area.  You may need to drink more fluids (try electrolyte drink like Gatorade), rest, or take your usual migraine medications.   **If your headaches do not resolve, worsen, are accompanied by other symptoms, or if your blood pressure is high, please call your primary physician for recommendation and/or examination.    Q:  I am unable to urinate.  What do I do?  A:  A small percentage of people can have difficulty urinating initially after surgery.  This includes being able to urinate only a very small amount at a time and feeling discomfort or pressure in the very low abdomen.  This is called  urinary retention , and is actually an urgent situation.  Proceed to your nearest Emergency department for evaluation (not an Urgent Care Center).  Sometimes the bladder does not work correctly after certain medications you receive during surgery, or related to certain procedures.  You may need to have a catheter placed until your bladder recovers.  When planning to go to an Emergency department, it may help to call the ER to let them know you are coming in for this problem after a surgery.  This may help you get in quicker to be evaluated.  **If you have symptoms of a urinary tract infection, please contact your primary physician for the proper evaluation and treatment.          If you have other questions, please call the office Monday thru Friday between 8am and 5pm to discuss with the nurse or physician assistant.  #[phone]    There is a surgeon ON CALL on weekday evenings and over the weekend in case of urgent need only, and may be contacted at the same number.    If you are having an emergency, call 911 or proceed to your nearest emergency department.  GENERAL ANESTHESIA OR SEDATION ADULT DISCHARGE INSTRUCTIONS   SPECIAL PRECAUTIONS FOR 24 HOURS AFTER SURGERY    IT IS NOT UNUSUAL TO FEEL  LIGHT-HEADED OR FAINT, UP TO 24 HOURS AFTER SURGERY OR WHILE TAKING PAIN MEDICATION.  IF YOU HAVE THESE SYMPTOMS; SIT FOR A FEW MINUTES BEFORE STANDING AND HAVE SOMEONE ASSIST YOU WHEN YOU GET UP TO WALK OR USE THE BATHROOM.    YOU SHOULD REST AND RELAX FOR THE NEXT 24 HOURS AND YOU MUST MAKE ARRANGEMENTS TO HAVE SOMEONE STAY WITH YOU FOR AT LEAST 24 HOURS AFTER YOUR DISCHARGE.  AVOID HAZARDOUS AND STRENUOUS ACTIVITIES.  DO NOT MAKE IMPORTANT DECISIONS FOR 24 HOURS.    DO NOT DRIVE ANY VEHICLE OR OPERATE MECHANICAL EQUIPMENT FOR 24 HOURS FOLLOWING THE END OF YOUR SURGERY.  EVEN THOUGH YOU MAY FEEL NORMAL, YOUR REACTIONS MAY BE AFFECTED BY THE MEDICATION YOU HAVE RECEIVED.    DO NOT DRINK ALCOHOLIC BEVERAGES FOR 24 HOURS FOLLOWING YOUR SURGERY.    DRINK CLEAR LIQUIDS (APPLE JUICE, GINGER ALE, 7-UP, BROTH, ETC.).  PROGRESS TO YOUR REGULAR DIET AS YOU FEEL ABLE.    YOU MAY HAVE A DRY MOUTH, A SORE THROAT, MUSCLES ACHES OR TROUBLE SLEEPING.  THESE SHOULD GO AWAY AFTER 24 HOURS.    CALL YOUR DOCTOR FOR ANY OF THE FOLLOWING:  SIGNS OF INFECTION (FEVER, GROWING TENDERNESS AT THE SURGERY SITE, A LARGE AMOUNT OF DRAINAGE OR BLEEDING, SEVERE PAIN, FOUL-SMELLING DRAINAGE, REDNESS OR SWELLING.    IT HAS BEEN OVER 8 TO 10 HOURS SINCE SURGERY AND YOU ARE STILL NOT ABLE TO URINATE (PASS WATER).     Maximum acetaminophen (Tylenol) dose from all sources should not exceed 4 grams (4000 mg) per day. You have been prescribed norco which contains tylenol 325 mg      Norco given at 11:05 AM, 2 tablets.

## 2021-12-15 NOTE — OR NURSING
Discharge instructions reviewed with patient and friend, patient and friend verbalized understanding.

## 2021-12-15 NOTE — OP NOTE
PREOPERATIVE DIAGNOSIS: Left breast cancer, close lumpectomy inferior margin.   POSTOPERATIVE DIAGNOSIS: Left breast cancer, close lumpectomy inferior margin.   PROCEDURE: Re-excision of Left lumpectomy margins.   ANESTHESIA: General.   PREOPERATIVE MEDICATIONS: Ancef 2 gram IV.   SURGEON: Jennifer Reyes MD   ASSISTANT: Yael Staples PA-C   - the physician assistant was medically necessary in providing adequate exposure in the operating field, maintaining hemostasis, cutting suture, clamping and ligating blood vessels, and visualization of the anatomic structures throughout the surgical procedure.   INDICATIONS: Lydia Ac is an 71 year old female who had a Left breast cancer which was 0.8 cm focus of Ductal Carcinoma In-Situ. She had a lumpectomy done and grossly the margins were uninvolved.  However, on final pathology, the tumor did extend to within 0.6 millimeter of the inferior margin. For this reason, she is here for reexcision of the inferior margin.   DESCRIPTION OF PROCEDURE: The patient was placed supine. The Left breast was prepped and draped in the usual sterile fashion. The previous incision is reopened seroma cavity was entered superiorly. The entire lumpectomy cavity was re-excised with care to excise approximately a 1 cm  inferior margin. Grossly there is no tumor. The site was then inspected for hemostasis, irrigated. Clips for postoperative radiation purposes were then placed on the new margins and the site was closed using interrupted 3-0 subdermal Vicryl and 4-0 subcuticular Monocryl.  Marcaine 0.25% was infiltrated for post-op pain control.  The patient was transferred to recovery in good condition.   ESTIMATED BLOOD LOSS: 20 mL.   INTRAOPERATIVE FINDINGS: Grossly no residual tumor identified.   JENNIFER REYES MD

## 2021-12-15 NOTE — ANESTHESIA POSTPROCEDURE EVALUATION
Patient: Lydia Ac    Procedure: Procedure(s):  LUMPECTOMY, BREAST left       Diagnosis:Ductal carcinoma in situ (DCIS) of left breast [D05.12]  Diagnosis Additional Information: No value filed.    Anesthesia Type:  General    Note:  Disposition: Outpatient   Postop Pain Control: Uneventful            Sign Out: Well controlled pain   PONV: No   Neuro/Psych: Uneventful            Sign Out: Acceptable/Baseline neuro status   Airway/Respiratory: Uneventful            Sign Out: Acceptable/Baseline resp. status   CV/Hemodynamics: Uneventful            Sign Out: Acceptable CV status   Other NRE: NONE   DID A NON-ROUTINE EVENT OCCUR? No           Last vitals:  Vitals Value Taken Time   /74 12/15/21 1145   Temp 97.9  F (36.6  C) 12/15/21 1115   Pulse 68 12/15/21 1149   Resp 13 12/15/21 1149   SpO2 97 % 12/15/21 1149   Vitals shown include unvalidated device data.    Electronically Signed By: Jerry Naranjo MD  December 15, 2021  5:29 PM

## 2021-12-15 NOTE — ANESTHESIA PROCEDURE NOTES
Airway       Patient location during procedure: OR       Procedure Start/Stop Times: 12/15/2021 8:30 AM  Staff -        Anesthesiologist:  Jerry Naranjo MD       CRNA: Emilei Sandhu APRN CRNA       Performed By: CRNA  Consent for Airway        Urgency: elective  Indications and Patient Condition       Indications for airway management: ramya-procedural       Induction type:intravenous       Mask difficulty assessment: 1 - vent by mask    Final Airway Details       Final airway type: supraglottic airway    Supraglottic Airway Details        Type: LMA    Post intubation assessment        Placement verified by: capnometry, equal breath sounds and chest rise        Number of attempts at approach: 1       Number of other approaches attempted: 0       Secured with: other (comment) (silicone tape)       Ease of procedure: easy       Dentition: Intact and Unchanged

## 2021-12-15 NOTE — ANESTHESIA PREPROCEDURE EVALUATION
Anesthesia Pre-Procedure Evaluation    Patient: Lydia Ac   MRN: [id number] : 1950        Preoperative Diagnosis: Ductal carcinoma in situ (DCIS) of left breast [D05.12]    Procedure : Procedure(s):  LUMPECTOMY, BREAST left          Past Medical History:   Diagnosis Date     Anxiety      Arthritis     Knees     H/O hyperlipidemia      Hx of migraine headaches      Left bundle branch block       Past Surgical History:   Procedure Laterality Date     BREAST SURGERY      biopsy x3 (benign)     LUMPECTOMY BREAST WITH SEED LOCALIZATION Left 2021    Procedure: LUMPECTOMY, BREAST, WITH RADIOACTIVE SEED LOCALIZATION left;  Surgeon: Jennifer Rodriguez MD;  Location: [facility] OR     ORTHOPEDIC SURGERY Left     bunionectomy     right rotator cuff repair        No Known Allergies   Social History     Tobacco Use     Smoking status: Never Smoker     Smokeless tobacco: Never Used   Substance Use Topics     Alcohol use: Yes     Comment: occasional       Wt Readings from Last 1 Encounters:   12/15/21 81.6 kg (180 lb)        Anesthesia Evaluation   Pt has had prior anesthetic. Type: General.    No history of anesthetic complications       ROS/MED HX  ENT/Pulmonary:  - neg pulmonary ROS     Neurologic:     (+) migraines,     Cardiovascular:     (+) Dyslipidemia -----    METS/Exercise Tolerance:     Hematologic:  - neg hematologic  ROS     Musculoskeletal:   (+) arthritis,     GI/Hepatic:  - neg GI/hepatic ROS     Renal/Genitourinary:  - neg Renal ROS     Endo:  - neg endo ROS     Psychiatric/Substance Use:     (+) psychiatric history anxiety     Infectious Disease:  - neg infectious disease ROS     Malignancy:   (+) Malignancy, History of Breast.Breast CA Active status post.        Other:            Physical Exam    Airway        Mallampati: II   TM distance: > 3 FB   Neck ROM: full   Mouth opening: > 3 cm    Respiratory Devices and Support         Dental  no notable dental history         Cardiovascular    cardiovascular exam normal          Pulmonary   pulmonary exam normal                OUTSIDE LABS:  CBC: No results found for: WBC, HGB, HCT, PLT  BMP: No results found for: NA, POTASSIUM, CHLORIDE, CO2, BUN, CR, GLC  COAGS: No results found for: PTT, INR, FIBR  POC: No results found for: BGM, HCG, HCGS  HEPATIC: No results found for: ALBUMIN, PROTTOTAL, ALT, AST, GGT, ALKPHOS, BILITOTAL, BILIDIRECT, ACE  OTHER: No results found for: PH, LACT, A1C, WALLY, PHOS, MAG, LIPASE, AMYLASE, TSH, T4, T3, CRP, SED    Anesthesia Plan    ASA Status:  2   NPO Status:  NPO Appropriate    Anesthesia Type: MAC.     - Reason for MAC: straight local not clinically adequate   Induction: Intravenous.   Maintenance: Balanced.        Consents    Anesthesia Plan(s) and associated risks, benefits, and realistic alternatives discussed. Questions answered and patient/representative(s) expressed understanding.    - Discussed:     - Discussed with:  Patient      - Extended Intubation/Ventilatory Support Discussed: No.      - Patient is DNR/DNI Status: No    Use of blood products discussed: No .     Postoperative Care    Pain management: IV analgesics, Oral pain medications, Multi-modal analgesia.   PONV prophylaxis: Ondansetron (or other 5HT-3), Dexamethasone or Solumedrol, Background Propofol Infusion     Comments:                Jerry Naranjo MD

## 2021-12-15 NOTE — ANESTHESIA CARE TRANSFER NOTE
Patient: Lydia Ac    Procedure: Procedure(s):  LUMPECTOMY, BREAST left       Diagnosis: Ductal carcinoma in situ (DCIS) of left breast [D05.12]  Diagnosis Additional Information: No value filed.    Anesthesia Type:   General     Note:    Oropharynx: oropharynx clear of all foreign objects  Level of Consciousness: awake  Oxygen Supplementation: face mask  Level of Supplemental Oxygen (L/min / FiO2): 6 lpm  Independent Airway: airway patency satisfactory and stable  Dentition: dentition unchanged  Vital Signs Stable: post-procedure vital signs reviewed and stable  Report to RN Given: handoff report given  Patient transferred to: PACU  Comments: Patient with spontaneous respirations and adequate tidal volumes. Patient awake and responsive. LMA removed in OR to 6L facemask. To PACU ventilating well. VSS. Report given.    Handoff Report: Identifed the Patient, Identified the Reponsible Provider, Reviewed the pertinent medical history, Discussed the surgical course, Reviewed Intra-OP anesthesia mangement and issues during anesthesia, Set expectations for post-procedure period and Allowed opportunity for questions and acknowledgement of understanding      Vitals:  Vitals Value Taken Time   /82 12/15/21 0945   Temp 97  F (36.1  C) 12/15/21 0944   Pulse 77 12/15/21 0949   Resp 11 12/15/21 0949   SpO2 100 % 12/15/21 0949   Vitals shown include unvalidated device data.    Electronically Signed By: AVELINO Ferguson CRNA  December 15, 2021  9:50 AM

## 2021-12-17 LAB
PATH REPORT.COMMENTS IMP SPEC: NORMAL
PATH REPORT.FINAL DX SPEC: NORMAL
PATH REPORT.GROSS SPEC: NORMAL
PATH REPORT.MICROSCOPIC SPEC OTHER STN: NORMAL
PATH REPORT.MICROSCOPIC SPEC OTHER STN: NORMAL
PHOTO IMAGE: NORMAL

## 2021-12-17 PROCEDURE — 88307 TISSUE EXAM BY PATHOLOGIST: CPT | Mod: 26 | Performed by: PATHOLOGY

## 2021-12-17 PROCEDURE — 88342 IMHCHEM/IMCYTCHM 1ST ANTB: CPT | Mod: 26 | Performed by: PATHOLOGY

## 2021-12-17 PROCEDURE — 88341 IMHCHEM/IMCYTCHM EA ADD ANTB: CPT | Mod: 26 | Performed by: PATHOLOGY

## 2021-12-21 LAB
ALT SERPL-CCNC: 62 U/L (ref 9–52)
AST SERPL-CCNC: 64 U/L (ref 17–45)
CHOLESTEROL (EXTERNAL): 183 MG/DL (ref 0–200)
CREATININE (EXTERNAL): 0.6 MG/DL (ref 0.7–1.5)
GFR ESTIMATED (EXTERNAL): 104 ML/MIN (ref 60–128)
GLUCOSE (EXTERNAL): 91 MG/DL (ref 70–110)
HBA1C MFR BLD: 5.6 % (ref 4.8–5.6)
HDLC SERPL-MCNC: 67 MG/DL (ref 34–100)
LDL CHOLESTEROL (EXTERNAL): 83 MG/DL (ref 75–130)
POTASSIUM (EXTERNAL): 4.8 MMOL/L (ref 3.5–5.5)
TRIGLYCERIDES (EXTERNAL): 167 MG/DL (ref 0–200)

## 2021-12-29 DIAGNOSIS — C50.912 MALIGNANT NEOPLASM OF LEFT FEMALE BREAST, UNSPECIFIED ESTROGEN RECEPTOR STATUS, UNSPECIFIED SITE OF BREAST (H): Primary | ICD-10-CM

## 2022-01-05 ENCOUNTER — OFFICE VISIT (OUTPATIENT)
Dept: SURGERY | Facility: CLINIC | Age: 72
End: 2022-01-05
Payer: MEDICARE

## 2022-01-05 ENCOUNTER — TELEPHONE (OUTPATIENT)
Dept: SURGERY | Facility: CLINIC | Age: 72
End: 2022-01-05

## 2022-01-05 VITALS
DIASTOLIC BLOOD PRESSURE: 68 MMHG | RESPIRATION RATE: 16 BRPM | OXYGEN SATURATION: 98 % | SYSTOLIC BLOOD PRESSURE: 118 MMHG | HEART RATE: 70 BPM

## 2022-01-05 DIAGNOSIS — Z09 SURGICAL FOLLOWUP VISIT: Primary | ICD-10-CM

## 2022-01-05 PROCEDURE — 99024 POSTOP FOLLOW-UP VISIT: CPT | Performed by: SURGERY

## 2022-01-05 NOTE — LETTER
2022    RE: Lydia Ac, : 1950      Lydia is here for follow up of recent left breast surgery, Left lumpectomy 2 1/2 weeks ago.  She is without complaints.     She is considering avoiding radiation if possible.  Seems reasonable to me with close follow up and hormone therapy.     Has a tiny neck sebaceous cyst which keeps recurring.  Wants excision.     Smoking History:  Has never smoked.     Left breast site -  Incision is healing nicely.                                Seroma is minimal.      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, solid, cribriform and micropapillary subtypes, low nuclear grade  -Complex sclerosing lesion (1.2 cm) associated with atypical ductal hyperplasia, florid usual type ductal hyperplasia, columnar cell changes and microcalcifications  -Previous biopsy cavity and radioactive seed is identified  -Margins are negative for DCIS  -Negative for invasive malignancy  -Please see detailed tumor synopsis below     Re-excision margins:  Left breast , inferior margin, reexcision-  -Single focus of residual ductal carcinoma in situ (DCIS), cribriform subtype, low nuclear grade (0.5 mm extent)(please see comment)  -Margins are negative for DCIS (DCIS is 2.0 mm from the closest posterior margin)  -Negative for invasive malignancy     Assessment and Plan-  Lydia is doing well after lumpectomy for 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  Will schedule.  Needs follow up with Oncology -  Dr Harris  Needs follow up with Radiation Oncology -  Dr Cristofer Rodriguez MD

## 2022-01-05 NOTE — LETTER
2022    RE: Lydia Ac, : 1950    Lydia is here for follow up of recent left breast surgery, Left lumpectomy 2 1/2 weeks ago.  She is without complaints.     She is considering avoiding radiation if possible.  Seems reasonable to me with close follow up and hormone therapy.     Has a tiny neck sebaceous cyst which keeps recurring.  Wants excision.     Smoking History:  has never smoked.     Left breast site -  Incision is healing nicely.                                Seroma is minimal.      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, solid, cribriform and micropapillary subtypes, low nuclear grade  -Complex sclerosing lesion (1.2 cm) associated with atypical ductal hyperplasia, florid usual type ductal hyperplasia, columnar cell changes and microcalcifications  -Previous biopsy cavity and radioactive seed is identified  -Margins are negative for DCIS  -Negative for invasive malignancy  -Please see detailed tumor synopsis below     Re-excision margins:  Left breast , inferior margin, reexcision-  -Single focus of residual ductal carcinoma in situ (DCIS), cribriform subtype, low nuclear grade (0.5 mm extent)(please see comment)  -Margins are negative for DCIS (DCIS is 2.0 mm from the closest posterior margin)  -Negative for invasive malignancy     Assessment and Plan-  Lydia is doing well after lumpectomy for 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  Will schedule.  Needs follow up with Oncology -  Dr Harris  Needs follow up with Radiation Oncology -  Dr Cristofer Rodriguez MD     Please route or send letter to:  Primary Care Provider (PCP), Dr Clarke, Dr Harris and Dr Cleveland

## 2022-01-05 NOTE — TELEPHONE ENCOUNTER
Type of surgery: EXCISION RIGHT NECK CYST  Location of surgery: [facility]  Date and time of surgery: 1-12-22, 11 AM   Surgeon: DR. REYES   Pre-Op Appt Date: NA   Post-Op Appt Date: PATIENT TO SCHEDULE    Packet sent out: GIVEN TO PATIENT   Pre-cert/Authorization completed:  Not Applicable  Date: 1-5-22        *OFFICE SURGERY*  EXCISION RIGHT NECK CYST    LOCAL    30 MINS REQ    NON   [facility]

## 2022-01-05 NOTE — PROGRESS NOTES
Lydia is here for follow up of recent left breast surgery, Left lumpectomy 2 1/2 weeks ago.  She is without complaints.    She is considering avoiding radiation if possible.  Seems reasonable to me with close follow up and hormone therapy.    Has a tiny neck sebaceous cyst which keeps recurring.  Wants excision.    Smoking History:  has never smoked.    Left breast site -  Incision is healing nicely.                                Seroma is minimal.     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, solid, cribriform and micropapillary subtypes, low nuclear grade  -Complex sclerosing lesion (1.2 cm) associated with atypical ductal hyperplasia, florid usual type ductal hyperplasia, columnar cell changes and microcalcifications  -Previous biopsy cavity and radioactive seed is identified  -Margins are negative for DCIS  -Negative for invasive malignancy  -Please see detailed tumor synopsis below    Re-excision margins:  Left breast , inferior margin, reexcision-  -Single focus of residual ductal carcinoma in situ (DCIS), cribriform subtype, low nuclear grade (0.5 mm extent)(please see comment)  -Margins are negative for DCIS (DCIS is 2.0 mm from the closest posterior margin)  -Negative for invasive malignancy    Assessment and Plan-  Lydia is doing well after lumpectomy for 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  Will schedule.  Needs follow up with Oncology -  Dr Harris  Needs follow up with Radiation Oncology -  Dr Cristofer Rodriguez MD    Please route or send letter to:  Primary Care Provider (PCP), Dr Clarke, Dr Harris and Dr Cleveland

## 2022-01-06 NOTE — PROGRESS NOTES
RECORDS STATUS - BREAST    RECORDS REQUESTED FROM: EPIC   DATE REQUESTED: 1/12/2022   NOTES DETAILS STATUS   OFFICE NOTE from referring provider     OFFICE NOTE from medical oncologist NA    OFFICE NOTE from surgeon Complete See Breast Biopsies in [facility]   OFFICE NOTE from radiation oncologist     DISCHARGE SUMMARY from hospital Complete 12/15/2021 Breast Cancer Screening    DISCHARGE REPORT from the      OPERATIVE REPORT COmplete See Breast Biopsies in EPIC   MEDICATION LIST Complete [facility]   CLINICAL TRIAL TREATMENTS TO DATE     LABS     REQUEST BLOCKS FOR ALL BREAST CANCER PTS     PATHOLOGY REPORTS  (Tissue diagnosis, Stage, ER/NY percentage positive and intensity of staining, HER2 IHC, FISH, and all biopsies from breast and any distant metastasis)                 Complete 12/15/2021   Left breast , inferior margin, reexcision-  -Single focus of residual ductal carcinoma in situ (DCIS), cribriform subtype, low nuclear grade (0.5 mm extent)(please see comment)  -Margins are negative for DCIS (DCIS is 2.0 mm from the closest posterior margin)  -Negative for invasive malignancy    11/22/2021   Left breast, seed localized lumpectomy-  -Ductal carcinoma in situ (DCIS)    10/11/2021   A.  Left breast needle biopsy:  - Complex sclerosing lesion with atypical ductal hyperplasia, usual type ductal hyperplasia, columnar cell change and adenosis with focal areas of microcalcifications.   GENONOMIC TESTING     TYPE:   (Next Generation Sequencing, including Foundation One testing, and Oncotype score)     IMAGING (NEED IMAGES & REPORT)     CT SCANS     MRI     MAMMO Complete 11/22/2021, 10/11/2021 more in PACS   ULTRASOUND Complete 11/22/2021, 10/11/2021 more in PACS   PET     BONE SCAN     BRAIN MRI

## 2022-01-07 ENCOUNTER — TRANSFERRED RECORDS (OUTPATIENT)
Dept: HEALTH INFORMATION MANAGEMENT | Facility: CLINIC | Age: 72
End: 2022-01-07
Payer: MEDICARE

## 2022-01-07 ENCOUNTER — TRANSFERRED RECORDS (OUTPATIENT)
Dept: SURGERY | Facility: CLINIC | Age: 72
End: 2022-01-07
Payer: MEDICARE

## 2022-01-12 ENCOUNTER — PRE VISIT (OUTPATIENT)
Dept: ONCOLOGY | Facility: CLINIC | Age: 72
End: 2022-01-12

## 2022-01-12 ENCOUNTER — APPOINTMENT (OUTPATIENT)
Dept: LAB | Facility: CLINIC | Age: 72
End: 2022-01-12
Payer: MEDICARE

## 2022-01-12 ENCOUNTER — OFFICE VISIT (OUTPATIENT)
Dept: SURGERY | Facility: CLINIC | Age: 72
End: 2022-01-12
Payer: MEDICARE

## 2022-01-12 ENCOUNTER — PATIENT OUTREACH (OUTPATIENT)
Dept: ONCOLOGY | Facility: CLINIC | Age: 72
End: 2022-01-12

## 2022-01-12 ENCOUNTER — ONCOLOGY VISIT (OUTPATIENT)
Dept: ONCOLOGY | Facility: CLINIC | Age: 72
End: 2022-01-12
Attending: SURGERY
Payer: MEDICARE

## 2022-01-12 VITALS
RESPIRATION RATE: 16 BRPM | BODY MASS INDEX: 33.99 KG/M2 | HEIGHT: 61 IN | OXYGEN SATURATION: 99 % | WEIGHT: 180 LBS | SYSTOLIC BLOOD PRESSURE: 122 MMHG | DIASTOLIC BLOOD PRESSURE: 80 MMHG | HEART RATE: 71 BPM

## 2022-01-12 VITALS
WEIGHT: 182 LBS | SYSTOLIC BLOOD PRESSURE: 106 MMHG | DIASTOLIC BLOOD PRESSURE: 64 MMHG | TEMPERATURE: 98 F | BODY MASS INDEX: 34.36 KG/M2 | HEIGHT: 61 IN | RESPIRATION RATE: 16 BRPM | HEART RATE: 86 BPM | OXYGEN SATURATION: 98 %

## 2022-01-12 DIAGNOSIS — M85.88 OSTEOPENIA OF LUMBAR SPINE: Primary | ICD-10-CM

## 2022-01-12 DIAGNOSIS — L72.3 SEBACEOUS CYST: Primary | ICD-10-CM

## 2022-01-12 DIAGNOSIS — C50.912 MALIGNANT NEOPLASM OF LEFT FEMALE BREAST, UNSPECIFIED ESTROGEN RECEPTOR STATUS, UNSPECIFIED SITE OF BREAST (H): ICD-10-CM

## 2022-01-12 PROCEDURE — G0463 HOSPITAL OUTPT CLINIC VISIT: HCPCS

## 2022-01-12 PROCEDURE — 99205 OFFICE O/P NEW HI 60 MIN: CPT | Performed by: INTERNAL MEDICINE

## 2022-01-12 PROCEDURE — 11420 EXC H-F-NK-SP B9+MARG 0.5/<: CPT | Mod: 79 | Performed by: SURGERY

## 2022-01-12 PROCEDURE — 88304 TISSUE EXAM BY PATHOLOGIST: CPT | Mod: 59 | Performed by: PATHOLOGY

## 2022-01-12 RX ORDER — ANASTROZOLE 1 MG/1
1 TABLET ORAL DAILY
Qty: 90 TABLET | Refills: 3 | Status: SHIPPED | OUTPATIENT
Start: 2022-01-12 | End: 2022-12-12

## 2022-01-12 ASSESSMENT — MIFFLIN-ST. JEOR
SCORE: 1273.85
SCORE: 1282.93

## 2022-01-12 ASSESSMENT — PAIN SCALES - GENERAL: PAINLEVEL: NO PAIN (0)

## 2022-01-12 NOTE — PATIENT INSTRUCTIONS
Lydia is scheduled with Genetics 5/3/22 for a telephone visit.    She is also scheduled with Dr Harris on 5/11/22.    Lydia said that she will get her dexa bone scan done through her gynecologist.   -komal COY MD 3-4 months -- 5/11/22 at 9am  Bone density -- 1/25/22 at 9:30am  Genetics referral -- 5/3/22 at 9am    Mahnaz Lopez, RN, BSN, OCN  Nurse Care Coordinator  [facility] [place]  P: [phone]     F: [phone]

## 2022-01-12 NOTE — PROGRESS NOTES
5 mm anterior neck sebaceous cyst excised under local, 1% Lidocaine.   Closed with 4-0 Vicryl.    EBL < 5 ml.  Specimen to pathology.    RTC prn.  Jennifer Rodriguez MD

## 2022-01-12 NOTE — LETTER
1/12/2022         RE: Lydia Ac  [address]  [place] [address]        Dear Colleague,    Thank you for referring your patient, Lydia Ac, to the [facility]. Please see a copy of my visit note below.    Visit Date: 01/12/2022    Lydia Ac is a 71 -year-old patient who has been referred today by Dr. Rodriguez for a new diagnosis of left-sided DCIS.  She is referred for medical oncology consultation.    CHIEF COMPLAINT:    1.  Left-sided atypical ductal hyperplasia.  2.  Left-sided ductal carcinoma in situ.    HISTORY OF PRESENTING COMPLAINT:  Lydia is a 71-year-old postmenopausal patient who has previously been diagnosed with both left-sided atypical ductal hyperplasia and right-sided atypical ductal hyperplasia.  She has had a total of now 4 lumpectomies, 2 on the right and 2 on the left.  On this occasion, she went for a routine mammogram in 09/2021.  That mammogram showed an abnormality on the left breast at the 12 o'clock position, and she went on to have a left-sided diagnostic mammogram and ultrasound, which showed an irregular area in the left breast measuring 1 cm at roughly the 12 o'clock position.  She then went on and had an ultrasound-guided biopsy done in 10/2021.  The biopsy revealed a complex sclerosing lesion with atypical ductal hyperplasia and focal areas of microcalcifications.  In 11/2021 she had left breast localized lumpectomy, showing DCIS 0.8 cm in largest dimension.  There was also some associated atypical ductal hyperplasia.  Margins were negative.  In 12/2021 she had reexcision of an inferior margin, because it was close prior, and she had another single focus of low-grade DCIS found, which was 0.5 mm.  All the margins from the DCIS are now 2 mm or greater.  She had no evidence of any invasive carcinoma.    In summary, this is a 71-year-old postmenopausal patient with a new diagnosis of atypical ductal hyperplasia and  DCIS of the left breast.  The DCIS measured approximately 8-9 mm.  It is now completely excised with a 2 mm margin or greater around it.  She is here status post lumpectomy for advice regarding further therapy.  She has decided that she will not proceed with any radiation treatment.    FAMILY HISTORY:  She had a maternal grandmother with breast cancer and lung metastasis.  She had a mother with DCIS.  She had a maternal aunt with DCIS.  No history of ovarian cancer.    PAST MEDICAL HISTORY:  History of hypercholesterolemia, history of anxiety, history of neuropathy, history of prediabetes, history of retinal nevus.    PAST SURGICAL HISTORY:    1.  Four separate lumpectomies, 2 on the left, 2 on the right.  2.  History of bunionectomy.    SOCIAL HISTORY:  The patient lives alone.  She is  from her .  She has 3 children, all of whom are adults.  She has 1 daughter.    GENETIC TESTING:  The patient has never had genetic testing done, and I have made a referral today.    SOCIAL HISTORY:  The patient is retired as a .  She is a nonsmoker, drinks alcohol socially.    COMPREHENSIVE REVIEW OF SYSTEMS:  A 12-point comprehensive review of systems has been done with her today.  Overall, she feels well.  She denies cough, shortness of breath, bone pain, any worrisome symptomatology from my standpoint.  She has tolerated her surgery well, and she has healed up nicely from the surgery.    PHYSICAL EXAMINATION:    GENERAL:  She is a well-appearing lady in no acute distress.  VITAL SIGNS:  Stable.  NECK:  Has no masses or goiter.  CHEST:  Clear to auscultation and percussion bilaterally.  HEART:  Sounds 1 and 2 normal, no added sounds, no murmurs.  BREASTS:  Right breast:  She has got a lumpectomy scar on the right side, no palpable masses, right axilla negative.  Left breast:  She has a more recent left-sided lumpectomy scar, no palpable masses, left axilla negative.  GASTROINTESTINAL:  Abdomen is  soft and nontender.  No hepatosplenomegaly.  EXTREMITIES:  Legs without tenderness or edema.  SKIN:  Has no rashes or lesions.  NEUROLOGIC:  The patient is alert and oriented x 3.  PSYCHIATRIC:  Normal.    DATA REVIEW:  I have reviewed all of her pathology today and discussed it with her.  I have also reviewed all of her imaging.    IMPRESSION AND PLAN:  This is a 71-year-old postmenopausal patient with a new diagnosis of left-sided atypical ductal hyperplasia as well as left-sided DCIS, which is now completely excised with a 2 mm or greater margin around it.  It is a low-grade DCIS, and the patient is here today to discuss her options.  She has decided that she does not want to have radiation therapy, so we will respect her wishes on that.  Her tumor is strongly positive for estrogen and progesterone receptors, and so she would be a good candidate for adjuvant hormonal treatment.  We have discussed hormonal therapy with both tamoxifen and aromatase inhibitors.  Because of her postmenopausal status, I think she would be a good candidate for an aromatase inhibitor.  We will need to keep an eye on her bone density, and I have ordered a bone density on her today, since she has been a while since she has had one.  We have discussed Arimidex at length.  We have discussed the risks, benefits, and side effects to include the risk on bone density as well as the other minor side effects associated with the drug.  The patient understands and is willing to proceed.  I have also made a referral today for Genetics given the extent of breast disease in the family.  She has had several questions today, which I have answered to the best of my ability.  I am going to see her back in about 3-4 months to see how she is tolerating the Arimidex.  In the meantime, we will get a bone density test to check the baseline bone density.  All of the above has been discussed with the patient.  She is in agreement with the plan.    Thank you so  much for referring her today.    Alma Harris MD        D: 2022   T: 2022   MT: JEFF    Name:     FLAKITA SANDY  MRN:      [id number]        Account:    [id number]   :      1950           Visit Date: 2022     Document: [id number]    cc:  MD Divya Khan MD       Again, thank you for allowing me to participate in the care of your patient.        Sincerely,        Alma Harris MD

## 2022-01-12 NOTE — NURSING NOTE
"Oncology Rooming Note    January 12, 2022 1:32 PM   Lydia Ac is a 71 year old female who presents for:    Chief Complaint   Patient presents with     Oncology Clinic Visit     Malignant neoplasm of left female breast,      Initial Vitals: /64 (Cuff Size: Adult Large)   Pulse 86   Temp 98  F (36.7  C) (Tympanic)   Resp 16   Ht 1.549 m (5' 1\")   Wt 82.6 kg (182 lb)   SpO2 98%   BMI 34.39 kg/m   Estimated body mass index is 34.39 kg/m  as calculated from the following:    Height as of this encounter: 1.549 m (5' 1\").    Weight as of this encounter: 82.6 kg (182 lb). Body surface area is 1.89 meters squared.  No Pain (0) Comment: Data Unavailable   No LMP recorded. Patient is postmenopausal.  Allergies reviewed: Yes  Medications reviewed: Yes    Medications: Medication refills not needed today.  Pharmacy name entered into [facility]:    [id number] [place], [place] - [address] AT [facility] [place] [address] [id number] - [place], [place] - [address] [id number] - [place] - [address] [facility]    Clinical concerns: new patient       Joan Breen CMA              "

## 2022-01-13 LAB
PATH REPORT.COMMENTS IMP SPEC: NORMAL
PATH REPORT.COMMENTS IMP SPEC: NORMAL
PATH REPORT.FINAL DX SPEC: NORMAL
PATH REPORT.GROSS SPEC: NORMAL
PATH REPORT.MICROSCOPIC SPEC OTHER STN: NORMAL
PATH REPORT.RELEVANT HX SPEC: NORMAL
PHOTO IMAGE: NORMAL

## 2022-01-13 NOTE — PROGRESS NOTES
Visit Date: 01/12/2022    Lydia Ac is a 71 -year-old patient who has been referred today by Dr. Rodriguez for a new diagnosis of left-sided DCIS.  She is referred for medical oncology consultation.    CHIEF COMPLAINT:    1.  Left-sided atypical ductal hyperplasia.  2.  Left-sided ductal carcinoma in situ.    HISTORY OF PRESENTING COMPLAINT:  Lydia is a 71-year-old postmenopausal patient who has previously been diagnosed with both left-sided atypical ductal hyperplasia and right-sided atypical ductal hyperplasia.  She has had a total of now 4 lumpectomies, 2 on the right and 2 on the left.  On this occasion, she went for a routine mammogram in 09/2021.  That mammogram showed an abnormality on the left breast at the 12 o'clock position, and she went on to have a left-sided diagnostic mammogram and ultrasound, which showed an irregular area in the left breast measuring 1 cm at roughly the 12 o'clock position.  She then went on and had an ultrasound-guided biopsy done in 10/2021.  The biopsy revealed a complex sclerosing lesion with atypical ductal hyperplasia and focal areas of microcalcifications.  In 11/2021 she had left breast localized lumpectomy, showing DCIS 0.8 cm in largest dimension.  There was also some associated atypical ductal hyperplasia.  Margins were negative.  In 12/2021 she had reexcision of an inferior margin, because it was close prior, and she had another single focus of low-grade DCIS found, which was 0.5 mm.  All the margins from the DCIS are now 2 mm or greater.  She had no evidence of any invasive carcinoma.    In summary, this is a 71-year-old postmenopausal patient with a new diagnosis of atypical ductal hyperplasia and DCIS of the left breast.  The DCIS measured approximately 8-9 mm.  It is now completely excised with a 2 mm margin or greater around it.  She is here status post lumpectomy for advice regarding further therapy.  She has decided that she will not proceed with any  radiation treatment.    FAMILY HISTORY:  She had a maternal grandmother with breast cancer and lung metastasis.  She had a mother with DCIS.  She had a maternal aunt with DCIS.  No history of ovarian cancer.    PAST MEDICAL HISTORY:  History of hypercholesterolemia, history of anxiety, history of neuropathy, history of prediabetes, history of retinal nevus.    PAST SURGICAL HISTORY:    1.  Four separate lumpectomies, 2 on the left, 2 on the right.  2.  History of bunionectomy.    SOCIAL HISTORY:  The patient lives alone.  She is  from her .  She has 3 children, all of whom are adults.  She has 1 daughter.    GENETIC TESTING:  The patient has never had genetic testing done, and I have made a referral today.    SOCIAL HISTORY:  The patient is retired as a .  She is a nonsmoker, drinks alcohol socially.    COMPREHENSIVE REVIEW OF SYSTEMS:  A 12-point comprehensive review of systems has been done with her today.  Overall, she feels well.  She denies cough, shortness of breath, bone pain, any worrisome symptomatology from my standpoint.  She has tolerated her surgery well, and she has healed up nicely from the surgery.    PHYSICAL EXAMINATION:    GENERAL:  She is a well-appearing lady in no acute distress.  VITAL SIGNS:  Stable.  NECK:  Has no masses or goiter.  CHEST:  Clear to auscultation and percussion bilaterally.  HEART:  Sounds 1 and 2 normal, no added sounds, no murmurs.  BREASTS:  Right breast:  She has got a lumpectomy scar on the right side, no palpable masses, right axilla negative.  Left breast:  She has a more recent left-sided lumpectomy scar, no palpable masses, left axilla negative.  GASTROINTESTINAL:  Abdomen is soft and nontender.  No hepatosplenomegaly.  EXTREMITIES:  Legs without tenderness or edema.  SKIN:  Has no rashes or lesions.  NEUROLOGIC:  The patient is alert and oriented x 3.  PSYCHIATRIC:  Normal.    DATA REVIEW:  I have reviewed all of her pathology today  and discussed it with her.  I have also reviewed all of her imaging.    IMPRESSION AND PLAN:  This is a 71-year-old postmenopausal patient with a new diagnosis of left-sided atypical ductal hyperplasia as well as left-sided DCIS, which is now completely excised with a 2 mm or greater margin around it.  It is a low-grade DCIS, and the patient is here today to discuss her options.  She has decided that she does not want to have radiation therapy, so we will respect her wishes on that.  Her tumor is strongly positive for estrogen and progesterone receptors, and so she would be a good candidate for adjuvant hormonal treatment.  We have discussed hormonal therapy with both tamoxifen and aromatase inhibitors.  Because of her postmenopausal status, I think she would be a good candidate for an aromatase inhibitor.  We will need to keep an eye on her bone density, and I have ordered a bone density on her today, since she has been a while since she has had one.  We have discussed Arimidex at length.  We have discussed the risks, benefits, and side effects to include the risk on bone density as well as the other minor side effects associated with the drug.  The patient understands and is willing to proceed.  I have also made a referral today for Genetics given the extent of breast disease in the family.  She has had several questions today, which I have answered to the best of my ability.  I am going to see her back in about 3-4 months to see how she is tolerating the Arimidex.  In the meantime, we will get a bone density test to check the baseline bone density.  All of the above has been discussed with the patient.  She is in agreement with the plan.    Thank you so much for referring her today.    Alma Harris MD        D: 2022   T: 2022   MT: [facility]    Name:     FLAKITA SANDY  MRN:      [id number]        Account:    [id number]   :      1950           Visit Date: 2022      Document: [id number]    cc:  MD Divya Khan MD

## 2022-02-01 ENCOUNTER — TRANSFERRED RECORDS (OUTPATIENT)
Dept: HEALTH INFORMATION MANAGEMENT | Facility: CLINIC | Age: 72
End: 2022-02-01
Payer: MEDICARE

## 2022-02-01 LAB
ALT SERPL-CCNC: 46 U/L (ref 9–52)
AST SERPL-CCNC: 59 U/L (ref 17–45)

## 2022-02-03 ENCOUNTER — HOSPITAL ENCOUNTER (OUTPATIENT)
Dept: BONE DENSITY | Facility: CLINIC | Age: 72
Discharge: HOME | End: 2022-02-03
Attending: INTERNAL MEDICINE | Admitting: INTERNAL MEDICINE
Payer: MEDICARE

## 2022-02-03 DIAGNOSIS — M85.88 OSTEOPENIA OF LUMBAR SPINE: ICD-10-CM

## 2022-02-03 PROCEDURE — 77080 DXA BONE DENSITY AXIAL: CPT

## 2022-02-19 ENCOUNTER — HEALTH MAINTENANCE LETTER (OUTPATIENT)
Age: 72
End: 2022-02-19

## 2022-03-21 ENCOUNTER — PATIENT OUTREACH (OUTPATIENT)
Dept: ONCOLOGY | Facility: CLINIC | Age: 72
End: 2022-03-21
Payer: MEDICARE

## 2022-03-21 NOTE — PROGRESS NOTES
Lydia called the clinic requesting an early refill of her arimidex ahead of a month-long vacation to [place] at the end of the month. Writer connected with pharmacist, Mallorie, at the [facility] in [place] on this request. Mallorie will connect with Lydia's insurance for approval of a one-time vacation advance of Lydia's arimidex. Mallorie will call the clinic if there's anything additional that needs to be done on our end.    Mahnaz Lopez, RN, BSN, OCN  Nurse Care Coordinator  [facility] [place]  P: [phone]     F: [phone]

## 2022-05-03 ENCOUNTER — VIRTUAL VISIT (OUTPATIENT)
Dept: ONCOLOGY | Facility: CLINIC | Age: 72
End: 2022-05-03
Attending: GENETIC COUNSELOR, MS
Payer: MEDICARE

## 2022-05-03 DIAGNOSIS — C50.912 MALIGNANT NEOPLASM OF LEFT FEMALE BREAST, UNSPECIFIED ESTROGEN RECEPTOR STATUS, UNSPECIFIED SITE OF BREAST (H): Primary | ICD-10-CM

## 2022-05-03 DIAGNOSIS — Z80.3 FAMILY HISTORY OF MALIGNANT NEOPLASM OF BREAST: ICD-10-CM

## 2022-05-03 PROCEDURE — 96040 HC GENETIC COUNSELING, EACH 30 MINUTES: CPT | Mod: TEL | Performed by: GENETIC COUNSELOR, MS

## 2022-05-03 NOTE — LETTER
Cancer Risk Management  Program Locations    [facility]  [facility]  [facility]  [facility]  Mailing Address  Cancer Risk Management Program  [facility]  [address]  [place] [address]    New patient appointments  [phone]  May 7, 2022    Lydia TAYLOR Yashira  [address] [facility] [address]      Dear Lydia,    It was a pleasure talking with you on 5-3-2022. Here is a copy of the progress note from your recent genetic counseling visit to the Cancer Risk Management Program. If you have any additional questions, please feel free to contact me.      Cancer Risk Management Program Genetic Counseling Note    5/3/2022    Referring Provider: Dr. Alma Harris    Presenting Information:   I had a telephone visit with Ldyia Ac today for genetic counseling through the Cancer Risk Management Program, in order to discuss her personal history of ductal carcinoma in situ (DCIS) and her family history of breast (and other) cancer.  She presents today to review this history, cancer screening recommendations, and available genetic testing options.    Personal History:  Lydia is a 72 year old female. 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  This DCIS was discovered after a routine mammogram in September noted an irregular area in the left breast, leading to more imaging, which noted calcifications.  Lydia underwent a biopsy in October, which was consistent with ADH, but she decided to undergo a lumpectomy of that area in November.  Pathology studies from the removed tissue noted DCIS, in addition to the ADH.  She then underwent a re-excision of that area in December because of close margins, and another small focus of DCIS was noted.      Lydia has  declined radiation therapy.  However, Lydia's DCIS cells were noted to be estrogen receptor and progesterone receptor positive, and so she has started to take Arimidex.  (She does state that she stopped taking this medication for a time period because she was concerned that it was causing pains in her feet; however, she started to take this medication again a few days ago.  She plans to talk about this with Dr. Harris at a follow-up appointment later this week.)    With respect to her other cancer screening history, Lydia states that she has had at least 2 colonoscopies; she reports that she does not recall having any polyps noted on those colonoscopies, and she reports being told that she should have a colonoscopy every 10 years.  (I did not have records of these colonoscopies to review today.)  Lydia states that she does have periodic dermatology exams.      Family History: (Please see scanned pedigree for detailed family history information)    As noted above, Lydia was diagnosed with DCIS of the left breast at age 72.  She also has a history of ADH in both breasts.    Lydia reports that her mother was diagnosed with DCIS at age 70, for which she underwent both surgery and radiation.      Lydia states that one of her maternal aunt was diagnosed with breast cancer in her 90s.    Lydia states that another one of her aunts was diagnosed with lung cancer in her 70s; she reports that this aunt had a history of smoking.    Lydia reports that she has a maternal first cousin that  of cancer in her 70s; she thought that this was either a breast cancer or a lung cancer.    Lydia reports that she has a paternal aunt and a paternal uncle who both  of lung cancer; she reports that they both had a history of smoking.    Lydia reports that she is of mixed  ancestry ([place], [place], and other northern and [place] ancestry).  She states that ancestry genetic testing noted that she has 0.2% Ashkenazi Nondenominational  "ancestry. There is no reported consanguinity.    Discussion:    We reviewed the features of sporadic, familial, and hereditary cancers. In looking at Lydia's family history, it is possible that a cancer susceptibility gene is present due to her person history of breast cancer, in addition to her history of at least 3 close relatives with breast cancer.  These breast cancers have affected at least 3 generations on one side of her family.  (On the other hand, we talked about that is seems most likely that the lung cancers in her family are related to the shared environmental risk factor of smoking.)      We discussed the natural history and genetics of hereditary cancers associated with breast cancer. A detailed handout regarding some of these hereditary cancer syndromes and the information we discussed was provided to Lydia after our appointment today and can be found in the \"patient instructions.\" Topics included: inheritance pattern, cancer risks, cancer screening recommendations, and also risks, benefits and limitations of testing.      Based on her personal and family history, Lydia meets current National Comprehensive Cancer Network (NCCN) criteria for genetic testing of high-penetrance breast cancer susceptibility genes (including BRCA1, BRCA2, CDH1, PALB2, PTEN, and TP53), because of her personal history of breast cancer, in combination with 3 close relatives with breast cancer.        We discussed that there are additional genes besides those listed above that could cause increased risk for breast (and other) cancer. As many of these genes present with overlapping features in a family and accurate cancer risk cannot always be established based upon the pedigree analysis alone, it would be reasonable for Lydia to consider panel genetic testing to analyze multiple genes at once.      Lydia stated that she was interested in pursuing genetic testing through a panel of genes associated with hereditary cancer " syndromes, and so we reviewed the various panel options and their potential benefits and limitations.  After this conversation, Lydia decided that she was interested in BRCA1/BRCA2 analysis with an automatic reflex to the Common Hereditary Cancers + Melanoma genetic testing panels at [facility].      The [facility]reditary [facility] includes analysis for 47 genes associated with cancers of the breast, ovary, uterus, prostate and gastrointestinal system: APC, ARPIT, AXIN2, BARD1, BMPR1A, BRCA1, BRCA2, BRIP1, CDH1, CDK4, CDKN2A, CHEK2, CTNNA1, DICER1, EPCAM, GREM1, HOXB13, KIT, MEN1, MLH1, MSH2, MSH3, MSH6, MUTYH, NBN, NF1, NTHL1, PALB2, PDGFRA, PMS2, POLD1, POLE, PTEN,RAD50, RAD51C, RAD51D, SDHA, SDHB, SDHC, SDHD, SMAD4, SMARCA4, STK11, TP53,TSC1, TSC2, and VHL.  The Melanoma gene panel also adds on: BAP1, MITF, POT1, and RB1.       We discussed that many of these genes are associated with specific hereditary cancer syndromes and published management guidelines: Hereditary Breast and Ovarian Cancer syndrome (BRCA1, BRCA2), Beauchamp syndrome (MLH1, MSH2, MSH6, PMS2, EPCAM), Familial Adenomatous Polyposis (APC), Hereditary Diffuse Gastric Cancer (CDH1), Familial Atypical Multiple Mole Melanoma syndrome (CDK4, CDKN2A), Multiple Endocrine Neoplasia type 1 (MEN1), Juvenile Polyposis syndrome (BMPR1A, SMAD4), Cowden syndrome (PTEN), Li Fraumeni syndrome (TP53), Hereditary Paraganglioma and Pheochromocytoma syndrome (SDHA, SDHB, SDHC, SDHD), Peutz-Jeghers syndrome (STK11), MUTYH Associated Polyposis (MUTYH), Tuberous sclerosis complex (TSC1, TSC2), Von Hippel-Lindau disease (VHL), Retinoblastoma (RB1) and Neurofibromatosis type 1 (NF1). The ARPIT, AXIN2, BRIP1, BAP1, CHEK2, GREM1, MSH3, NBN, NTHL1, PALB2, POLD1, POLE, RAD51C, and RAD51D genes are associated with increased cancer risk and have published management guidelines for certain cancers. The remaining genes (BARD1, CTNNA1, DICER1, HOXB13,  KIT, MITF, POT1, PDGFRA, RAD50, and SMARCA4) are associated with increased cancer risk and may allow us to make medical recommendations when mutations are identified.        Medical Management: For Lydia, we reviewed that the information from genetic testing may determine:    additional cancer screening for which Lydia may qualify (eg. mammogram and breast MRI, more frequent colonoscopies, more frequent dermatologic exams, etc.),    options for risk reducing surgeries Lydia could consider (eg. bilateral mastectomy, surgery to remove her ovaries and/or uterus, etc),      and targeted therapies for Lydia, if she were to develop certain cancers in the future (eg. immunotherapy for individuals with Beauchamp syndrome, PARP inhibitors for HBOC syndrome, etc.).       These recommendations and possible targeted therapies will be discussed in detail once genetic testing is completed.     Plan:  1) Today, Lydia decided to proceed with a BRCA1/BRCA2 with an automatic reflex to the Common Hereditary Cancers + Melanoma genetic testing panel at [facility].  Therefore, consent was reviewed and verbally obtained for this testing.    2) We reviewed the options for sample collection.  Lydia plans to have her blood drawn at an appointment later this week. Test results are expected to be available within 4-6 weeks of that blood draw.  3) Lydia will have a telephone visit to discuss the results.  Additional recommendations about screening will be made at that time.    Alicia Chacon MS, [facility]  Genetic Counselor  Ph: [phone]

## 2022-05-03 NOTE — PROGRESS NOTES
Lydia is a 72 year old who is being evaluated via a billable telephone visit.      What phone number would you like to be contacted at? [phone]  How would you like to obtain your AVS? [facility]  Phone call duration: 36 minutes    Madison Santana VF

## 2022-05-03 NOTE — LETTER
5/3/2022     RE: Lydia Ac  [address]  [place] [address]    Dear Colleague,    Thank you for referring your patient, Lydia Ac, to the [facility]. Please see a copy of my visit note below.    Lydia is a 72 year old who is being evaluated via a billable telephone visit.      What phone number would you like to be contacted at? [phone]  How would you like to obtain your AVS? [employer]  Phone call duration: 36 minutes    Madison Santana       Cancer Risk Management Program Genetic Counseling Note    5/3/2022    Referring Provider: Dr. Alma Harris    Presenting Information:   I had a telephone visit with Lydia Ac today for genetic counseling through the Cancer Risk Management Program, in order to discuss her personal history of ductal carcinoma in situ (DCIS) and her family history of breast (and other) cancer.  She presents today to review this history, cancer screening recommendations, and available genetic testing options.    Personal History:  Lydia is a 72 year old female. 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  This DCIS was discovered after a routine mammogram in September noted an irregular area in the left breast, leading to more imaging, which noted calcifications.  Lydia underwent a biopsy in October, which was consistent with ADH, but she decided to undergo a lumpectomy of that area in November.  Pathology studies from the removed tissue noted DCIS, in addition to the ADH.  She then underwent a re-excision of that area in December because of close margins, and another small focus of DCIS was noted.      Lydia has declined radiation therapy.  However, Lydia's DCIS cells were noted to be estrogen receptor and progesterone receptor positive, and so she has started to take Arimidex.  (She does state that she stopped taking this medication  for a time period because she was concerned that it was causing pains in her feet; however, she started to take this medication again a few days ago.  She plans to talk about this with Dr. Harris at a follow-up appointment later this week.)    With respect to her other cancer screening history, Lydia states that she has had at least 2 colonoscopies; she reports that she does not recall having any polyps noted on those colonoscopies, and she reports being told that she should have a colonoscopy every 10 years.  (I did not have records of these colonoscopies to review today.)  Lydia states that she does have periodic dermatology exams.      Family History: (Please see scanned pedigree for detailed family history information)    As noted above, Lydia was diagnosed with DCIS of the left breast at age 72.  She also has a history of ADH in both breasts.    Lydia reports that her mother was diagnosed with DCIS at age 70, for which she underwent both surgery and radiation.      Lydia states that one of her maternal aunt was diagnosed with breast cancer in her 90s.    Lydia states that another one of her aunts was diagnosed with lung cancer in her 70s; she reports that this aunt had a history of smoking.    Lydia reports that she has a maternal first cousin that  of cancer in her 70s; she thought that this was either a breast cancer or a lung cancer.    Lydia reports that she has a paternal aunt and a paternal uncle who both  of lung cancer; she reports that they both had a history of smoking.    Lydia reports that she is of mixed  ancestry ([place], Bulgarian, and other northern and [place] ancestry).  She states that ancestry genetic testing noted that she has 0.2% Ashkenazi Cheondoism ancestry. There is no reported consanguinity.    Discussion:    We reviewed the features of sporadic, familial, and hereditary cancers. In looking at Lydia's family history, it is possible that a cancer susceptibility gene is  "present due to her person history of breast cancer, in addition to her history of at least 3 close relatives with breast cancer.  These breast cancers have affected at least 3 generations on one side of her family.  (On the other hand, we talked about that is seems most likely that the lung cancers in her family are related to the shared environmental risk factor of smoking.)      We discussed the natural history and genetics of hereditary cancers associated with breast cancer. A detailed handout regarding some of these hereditary cancer syndromes and the information we discussed was provided to Lydia after our appointment today and can be found in the \"patient instructions.\" Topics included: inheritance pattern, cancer risks, cancer screening recommendations, and also risks, benefits and limitations of testing.      Based on her personal and family history, Lydia meets current National Comprehensive Cancer Network (NCCN) criteria for genetic testing of high-penetrance breast cancer susceptibility genes (including BRCA1, BRCA2, CDH1, PALB2, PTEN, and TP53), because of her personal history of breast cancer, in combination with 3 close relatives with breast cancer.        We discussed that there are additional genes besides those listed above that could cause increased risk for breast (and other) cancer. As many of these genes present with overlapping features in a family and accurate cancer risk cannot always be established based upon the pedigree analysis alone, it would be reasonable for Lydia to consider panel genetic testing to analyze multiple genes at once.      Lydia stated that she was interested in pursuing genetic testing through a panel of genes associated with hereditary cancer syndromes, and so we reviewed the various panel options and their potential benefits and limitations.  After this conversation, Lydia decided that she was interested in BRCA1/BRCA2 analysis with an automatic reflex to the Common " Hereditary Cancers + Melanoma genetic testing panels at [facility].      The Common Hereditary Cancers genetic testing panel through [facility] includes analysis for 47 genes associated with cancers of the breast, ovary, uterus, prostate and gastrointestinal system: APC, ARPIT, AXIN2, BARD1, BMPR1A, BRCA1, BRCA2, BRIP1, CDH1, CDK4, CDKN2A, CHEK2, CTNNA1, DICER1, EPCAM, GREM1, HOXB13, KIT, MEN1, MLH1, MSH2, MSH3, MSH6, MUTYH, NBN, NF1, NTHL1, PALB2, PDGFRA, PMS2, POLD1, POLE, PTEN,RAD50, RAD51C, RAD51D, SDHA, SDHB, SDHC, SDHD, SMAD4, SMARCA4, STK11, TP53,TSC1, TSC2, and VHL.  The Melanoma gene panel also adds on: BAP1, MITF, POT1, and RB1.       We discussed that many of these genes are associated with specific hereditary cancer syndromes and published management guidelines: Hereditary Breast and Ovarian Cancer syndrome (BRCA1, BRCA2), Beauchamp syndrome (MLH1, MSH2, MSH6, PMS2, EPCAM), Familial Adenomatous Polyposis (APC), Hereditary Diffuse Gastric Cancer (CDH1), Familial Atypical Multiple Mole Melanoma syndrome (CDK4, CDKN2A), Multiple Endocrine Neoplasia type 1 (MEN1), Juvenile Polyposis syndrome (BMPR1A, SMAD4), Cowden syndrome (PTEN), Li Fraumeni syndrome (TP53), Hereditary Paraganglioma and Pheochromocytoma syndrome (SDHA, SDHB, SDHC, SDHD), Peutz-Jeghers syndrome (STK11), MUTYH Associated Polyposis (MUTYH), Tuberous sclerosis complex (TSC1, TSC2), Von Hippel-Lindau disease (VHL), Retinoblastoma (RB1) and Neurofibromatosis type 1 (NF1). The ARPIT, AXIN2, BRIP1, BAP1, CHEK2, GREM1, MSH3, NBN, NTHL1, PALB2, POLD1, POLE, RAD51C, and RAD51D genes are associated with increased cancer risk and have published management guidelines for certain cancers. The remaining genes (BARD1, CTNNA1, DICER1, HOXB13, KIT, MITF, POT1, PDGFRA, RAD50, and SMARCA4) are associated with increased cancer risk and may allow us to make medical recommendations when mutations are identified.        Medical Management: For Lydia, we reviewed that the  information from genetic testing may determine:    additional cancer screening for which Lydia may qualify (eg. mammogram and breast MRI, more frequent colonoscopies, more frequent dermatologic exams, etc.),    options for risk reducing surgeries Lydia could consider (eg. bilateral mastectomy, surgery to remove her ovaries and/or uterus, etc),      and targeted therapies for Lydia, if she were to develop certain cancers in the future (eg. immunotherapy for individuals with Beauchamp syndrome, PARP inhibitors for HBOC syndrome, etc.).       These recommendations and possible targeted therapies will be discussed in detail once genetic testing is completed.     Plan:  1) Today, Lydia decided to proceed with a BRCA1/BRCA2 with an automatic reflex to the Common Hereditary Cancers + Melanoma genetic testing panel at [facility].  Therefore, consent was reviewed and verbally obtained for this testing.    2) We reviewed the options for sample collection.  Lydia plans to have her blood drawn at an appointment later this week. Test results are expected to be available within 4-6 weeks of that blood draw.  3) Lydia will have a telephone visit to discuss the results.  Additional recommendations about screening will be made at that time.    Alicia Chacon MS, [facility]  Genetic Counselor  Ph: [phone]    Time spend on the phone: 36 minutes

## 2022-05-06 ENCOUNTER — LAB (OUTPATIENT)
Dept: LAB | Facility: CLINIC | Age: 72
End: 2022-05-06
Payer: MEDICARE

## 2022-05-06 DIAGNOSIS — C50.912 MALIGNANT NEOPLASM OF LEFT FEMALE BREAST, UNSPECIFIED ESTROGEN RECEPTOR STATUS, UNSPECIFIED SITE OF BREAST (H): ICD-10-CM

## 2022-05-06 DIAGNOSIS — Z80.3 FAMILY HISTORY OF MALIGNANT NEOPLASM OF BREAST: ICD-10-CM

## 2022-05-06 PROCEDURE — 99000 SPECIMEN HANDLING OFFICE-LAB: CPT

## 2022-05-06 PROCEDURE — 36415 COLL VENOUS BLD VENIPUNCTURE: CPT

## 2022-05-07 NOTE — PATIENT INSTRUCTIONS
Assessing Cancer Risk  Only about 5-10% of cancers are thought to be due to an inherited cancer susceptibility gene.    These families often have:  Several people with the same or related types of cancer  Cancers diagnosed at a young age (before age 50)  Individuals with more than one primary cancer  Multiple generations of the family affected with cancer    Some people may be candidates for genetic testing of more than one gene.  For these families, genetic testing using a cancer panel may be offered.  These panels will test different genes known to increase the risk for breast, ovarian, uterine, and/or other cancers. All of the genes discussed below have published clinical management guidelines for individuals who are found to carry a mutation. The purpose of this handout is to serve as a brief summary of the genes analyzed by the panels used to inquire about hereditary breast and gynecologic cancer:  ARPIT, BRCA1, BRCA2, BRIP1, CDH1, CHEK2, MLH1, MSH2, MSH6, PMS2, EPCAM, PTEN, PALB2, RAD51C, RAD51D, and TP53.  ______________________________________________________________________________  Hereditary Breast and Ovarian Cancer Syndrome   (BRCA1 and BRCA2)  A single mutation in one of the copies of BRCA1 or BRCA2 increases the risk for breast and ovarian cancer, among others.  The risk for pancreatic cancer and melanoma may also be slightly increased in some families.  The chart below shows the chance that someone with a BRCA mutation would develop cancer in his or her lifetime1,2,3,4.        A person s ethnic background is also important to consider, as individuals of Ashkenazi Amish ancestry have a higher chance of having a BRCA gene mutation.  There are three BRCA mutations that occur more frequently in this population.    Beauchamp Syndrome   (MLH1, MSH2, MSH6, PMS2, and EPCAM)  Currently five genes are known to cause Beauchamp Syndrome: MLH1, MSH2, MSH6, PMS2, and EPCAM.  A single mutation in one of the Beauchamp  Syndrome genes increases the risk for colon, endometrial, ovarian, and stomach cancers.  Other cancers that occur less commonly in Beauchamp Syndrome include urinary tract, skin, and brain cancers.  The chart below shows the chance that a person with Beauchamp syndrome would develop cancer in his or her lifetime5.      *Cancer risk varies depending on Beauchamp syndrome gene found    Cowden Syndrome   (PTEN)  Cowden syndrome is a hereditary condition that increases the risk for breast, thyroid, endometrial, colon, and kidney cancer.  Cowden syndrome is caused by a mutation in the PTEN gene.  A single mutation in one of the copies of PTEN causes Cowden syndrome and increases cancer risk.  The chart below shows the chance that someone with a PTEN mutation would develop cancer in their lifetime6,7.  Other benign features seen in some individuals with Cowden syndrome include benign skin lesions (facial papules, keratoses, lipomas), learning disability, autism, thyroid nodules, colon polyps, and larger head size.      *One recent study found breast cancer risk to be increased to 85%    Li-Fraumeni Syndrome   (TP53)  Li-Fraumeni Syndrome (LFS) is a cancer predisposition syndrome caused by a mutation in the TP53 gene. A single mutation in one of the copies of TP53 increases the risk for multiple cancers. Individuals with LFS are at an increased risk for developing cancer at a young age. The lifetime risk for development of a LFS-associated cancer is 50% by age 30 and 90% by age 60.   Core Cancers: Sarcomas, Breast, Brain, Lung, Leukemias/Lymphomas, Adrenocortical carcinomas  Other Cancers: Gastrointestinal, Thyroid, Skin, Genitourinary    Hereditary Diffuse Gastric Cancer   (CDH1)  Currently, one gene is known to cause hereditary diffuse gastric cancer (HDGC): CDH1.  Individuals with HDGC are at increased risk for diffuse gastric cancer and lobular breast cancer. Of people diagnosed with HDGC, 30-50% have a mutation in the CDH1 gene.   This suggests there are likely other genes that may cause HDGC that have not been identified yet.      Lifetime Cancer Risks    General Population HDGC    Diffuse Gastric  <1% ~80%   Breast 12% 39-52%         Additional Genes  ARPIT  ARPIT is a moderate-risk breast cancer gene. Women who have a mutation in ARPIT can have between a 2-4 fold increased risk for breast cancer compared to the general population8. ARPIT mutations have also been associated with increased risk for pancreatic cancer, however an estimate of this cancer risk is not well understood9. Individuals who inherit two ARPIT mutations have a condition called ataxia-telangiectasia (AT).  This rare autosomal recessive condition affects the nervous system and immune system, and is associated with progressive cerebellar ataxia beginning in childhood.  Individuals with ataxia-telangiectasia often have a weakened immune system and have an increased risk for childhood cancers.    PALB2  Mutations in PALB2 have been shown to increase the risk of breast cancer up to 33-58% in some families; where individuals fall within this risk range is dependent upon family gpuibcs51. PALB2 mutations have also been associated with increased risk for pancreatic cancer, although this risk has not been quantified yet.  Individuals who inherit two PALB2 mutations--one from their mother and one from their father--have a condition called Fanconi Anemia.  This rare autosomal recessive condition is associated with short stature, developmental delay, bone marrow failure, and increased risk for childhood cancers.    CHEK2   CHEK2 is a moderate-risk breast cancer gene.  Women who have a mutation in CHEK2 have around a 2-fold increased risk for breast cancer compared to the general population, and this risk may be higher depending upon family history.11,12,13 Mutations in CHEK2 have also been shown to increase the risk of a number of other cancers, including colon and prostate, however these  cancer risks are currently not well understood.    BRIP1, RAD51C and RAD51D  Mutations in BRIP1, RAD51C, and RAD51D have been shown to increase the risk of ovarian cancer and possibly female breast cancer as well14,15 .       Lifetime Cancer Risk    General Population BRIP1 RAD51C RAD51D   Ovarian 1-2% ~5-8% ~5-9% ~7-15%           Inheritance  All of the cancer syndromes reviewed above are inherited in an autosomal dominant pattern.  This means that if a parent has a mutation, each of his or her children will have a 50% chance of inheriting that same mutation.  Therefore, each child--male or female--would have a 50% chance of being at increased risk for developing cancer.      Image obtained from Genetics Home Reference, 2013     Mutations in some genes can occur de yvette, which means that a person s mutation occurred for the first time in them and was not inherited from a parent.  Now that they have the mutation, however, it can be passed on to future generations.    Genetic Testing  Genetic testing involves a blood test and will look at the genetic information in the ARPIT, BRCA1, BRCA2, BRIP1, CDH1, CHEK2, MLH1, MSH2, MSH6, PMS2, EPCAM, PTEN, PALB2, RAD51C, RAD51D, and TP53 genes for any harmful mutations that are associated with increased cancer risk.  If possible, it is recommended that the person(s) who has had cancer be tested before other family members.  That person will give us the most useful information about whether or not a specific gene is associated with the cancer in the family.    Results  There are three possible results of genetic testing:  Positive--a harmful mutation was identified in one or more of the genes  Negative--no mutation was identified in any of the genes on this panel  Variant of unknown significance--a variation in one of the genes was identified, but it is unclear how this impacts cancer risk in the family    Advantages and Disadvantages   There are advantages and disadvantages to  genetic testing.    Advantages  May clarify your cancer risk  Can help you make medical decisions  May explain the cancers in your family  May give useful information to your family members (if you share your results)    Disadvantages  Possible negative emotional impact of learning about inherited cancer risk  Uncertainty in interpreting a negative test result in some situations  Possible genetic discrimination concerns (see below)    Genetic Information Nondiscrimination Act (NASRA)  NASRA is a federal law that protects individuals from health insurance or employment discrimination based on a genetic test result alone.  Although rare, there are currently no legal discrimination protections in terms of life insurance, long term care, or disability insurances.  Visit the [employer] website to learn more.    Reducing Cancer Risk  All of the genes described above have nationally recognized cancer screening guidelines that would be recommended for individuals who test positive.  In addition to increased cancer screening, surgeries may be offered or recommended to reduce cancer risk.  Recommendations are based upon an individual s genetic test result as well as their personal and family history of cancer.    Questions to Think About Regarding Genetic Testing:  What effect will the test result have on me and my relationship with my family members if I have an inherited gene mutation?  If I don t have a gene mutation?  Should I share my test results, and how will my family react to this news, which may also affect them?  Are my children ready to learn new information that may one day affect their own health?    Hereditary Cancer Resources    FORCE: Facing Our Risk of Cancer Empowered facingourrisk.org   Bright Pink bebrightpink.org   Li-Fraumeni Syndrome Association lfsassociation.org   PTEN World PTENworld.Ventive   No stomach for cancer, Inc. nostomachforcancer.org   Stomach cancer relief network  Scrnet.org   Collaborative Group of the Americas on Inherited Colorectal Cancer (CGA) cgaicc.com    Cancer Care cancercare.org   American Cancer Society (ACS) cancer.org   National Cancer [place] (NCI) cancer.gov     Please call us if you have any questions or concerns.   Cancer Risk Management Program [phone]-CANCER ([phone])  Juarez Byers, MS [facility] [phone]  Anjelica Philip, MS, [facility] [phone]  BRIAN    Alicia Chacon, MS, [facility]  [phone]     [email or web address]  Annette Hagan, MS, [facility]  [phone]  Genesis Jose, MS, [facility]  [phone]  Luana Kelly, MS, [facility] [phone]  Princess Jesusita, MS, [facility] [phone]    References  Tony WEINBERG, Elis PDP, Gloria S, Renetta COYNE, Salo JE, Art JL, Pito N, Cammie H, Siri O, Cody A, Jimenez B, Todd P, Manelbert S, Ariela DM, Galindo N, Heather E, Cecilia H, Marcell E, Larry J, Gronkathy J, Ivania B, Jonn H, Thorlacius S, Eerola H, Rachid H, Clifford K, Jaclyn OP. Average risks of breast and ovarian cancer associated with BRCA1 or BRCA2 mutations detected in case series unselected for family history: a combined analysis of 222 studies. Am J Hum Krupa. 2003;72:1117-30.  Jeff BEDOYA, Martha M, Eugene G.  BRCA1 and BRCA2 Hereditary Breast and Ovarian Cancer. Gene Reviews online. 2013.  Shad YC, Kassie S, Lalo G, Johnson S. Breast cancer risk among male BRCA1 and BRCA2 mutation carriers. J Natl Cancer Inst. 2007;99:1811-4.  Yogesh TAYLOR, Monica I, Deuce J, Nicole E, Sony ER, Rip F. Risk of breast cancer in male BRCA2 carriers. J Med Krupa. 2010;47:710-1.  National Comprehensive Cancer Network. Clinical practice guidelines in oncology, colorectal cancer screening. Available online (registration required). 2015.  Maxim HANNA, Osiris J, Kvng J, Pham LA, Omero MS, Eng C. Lifetime cancer risks in individuals with germline PTEN mutations. Clin Cancer Res. 2012;18:400-7.  Alvarez MÉNDEZ. Cowden Syndrome: A Critical Review of the Clinical  Literature. J Krupa . 2009:18:13-27.  Brennan A, Singh D, Andrea S, Mckayla P, Deedee T, Brandon M, Pietro B, Kenan H, Walker R, Christiano K, Laureen L, Yogesh DG, Ariela D, Stanley DF, Nick MR, The Breast Cancer Susceptibility Collaboration () & Eliseo BEDOYA. ARPIT mutations that cause ataxia-telangiectasia are breast cancer susceptibility alleles. Nature Genetics. 2006;38:873-875  Jin N , Keiko Y, Rosette J, Tc L, Amalia GM , Andria ML, Gallinger S, Irby AG, Syngal S, Enedina ML, Hossein J , Marcial R, Brent SZ, Job JR, Rajan VE, Nathaly M, Votiffany B, Steffany N, Sujatha RH, Rosy KW, and Lior AP. ARPIT mutations in patients with hereditary pancreatic cancer. Cancer Discover. 2012;2:41-46  Tony SHEPARD, et al. Breast-Cancer Risk in Families with Mutations in PALB2. NEJM. 2014; 371(6):497-506.  CHEK2 Breast Cancer Case-Control Consortium. CHEK2*1100delC and susceptibility to breast cancer: A collaborative analysis involving 10,860 breast cancer cases and 9,065 controls from 10 studies. Am J Hum Krupa, 74 (2004), pp. 2715-1418  Chemo T, Belle S, Arianne K, et al. Spectrum of Mutations in BRCA1, BRCA2, CHEK2, and TP53 in Families at High Risk of Breast Cancer. ZULAY. 2006;295(12):8404-2057.   Liza C, Brandon D, Vivian A, et al. Risk of breast cancer in women with a CHEK2 mutation with and without a family history of breast cancer. J Clin Oncol. 2011;29:5664-2014.  Ricardo H, Darin E, Edouard SJ, et al. Contribution of germline mutations in the RAD51B, RAD51C, and RAD51D genes to ovarian cancer in the population. J Clin Oncol. 2015;33(26):8992-0616. Doi:10.1200/JCO.2015.61.8961.  Clement DONNELLY, Alexandre LORENZ, Ria P, et al. Mutations in BRIP1 confer high risk of ovarian cancer. Ceci Krupa. 2011;43(11):6008-2063. doi:10.1038/ng.955.

## 2022-05-07 NOTE — PROGRESS NOTES
Cancer Risk Management Program Genetic Counseling Note    5/3/2022    Referring Provider: Dr. Alma Harris    Presenting Information:   I had a telephone visit with Lydia Ac today for genetic counseling through the Cancer Risk Management Program, in order to discuss her personal history of ductal carcinoma in situ (DCIS) and her family history of breast (and other) cancer.  She presents today to review this history, cancer screening recommendations, and available genetic testing options.    Personal History:  Lydia is a 72 year old female. 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  This DCIS was discovered after a routine mammogram in September noted an irregular area in the left breast, leading to more imaging, which noted calcifications.  Lydia underwent a biopsy in October, which was consistent with ADH, but she decided to undergo a lumpectomy of that area in November.  Pathology studies from the removed tissue noted DCIS, in addition to the ADH.  She then underwent a re-excision of that area in December because of close margins, and another small focus of DCIS was noted.      Lydia has declined radiation therapy.  However, Lydia's DCIS cells were noted to be estrogen receptor and progesterone receptor positive, and so she has started to take Arimidex.  (She does state that she stopped taking this medication for a time period because she was concerned that it was causing pains in her feet; however, she started to take this medication again a few days ago.  She plans to talk about this with Dr. Harris at a follow-up appointment later this week.)    With respect to her other cancer screening history, Lydia states that she has had at least 2 colonoscopies; she reports that she does not recall having any polyps noted on those colonoscopies, and she reports being told that she should have a colonoscopy every 10 years.  (I did  not have records of these colonoscopies to review today.)  Lydia states that she does have periodic dermatology exams.      Family History: (Please see scanned pedigree for detailed family history information)    As noted above, Lydia was diagnosed with DCIS of the left breast at age 72.  She also has a history of ADH in both breasts.    Lydia reports that her mother was diagnosed with DCIS at age 70, for which she underwent both surgery and radiation.      Lydia states that one of her maternal aunt was diagnosed with breast cancer in her 90s.    Lydia states that another one of her aunts was diagnosed with lung cancer in her 70s; she reports that this aunt had a history of smoking.    Lydia reports that she has a maternal first cousin that  of cancer in her 70s; she thought that this was either a breast cancer or a lung cancer.    Lydia reports that she has a paternal aunt and a paternal uncle who both  of lung cancer; she reports that they both had a history of smoking.    Lydia reports that she is of mixed  ancestry ([place], [place], and other northern and [place] ancestry).  She states that ancestry genetic testing noted that she has 0.2% Ashkenazi Sabianism ancestry. There is no reported consanguinity.    Discussion:    We reviewed the features of sporadic, familial, and hereditary cancers. In looking at Lydai's family history, it is possible that a cancer susceptibility gene is present due to her person history of breast cancer, in addition to her history of at least 3 close relatives with breast cancer.  These breast cancers have affected at least 3 generations on one side of her family.  (On the other hand, we talked about that is seems most likely that the lung cancers in her family are related to the shared environmental risk factor of smoking.)      We discussed the natural history and genetics of hereditary cancers associated with breast cancer. A detailed handout regarding some of  "these hereditary cancer syndromes and the information we discussed was provided to Lydia after our appointment today and can be found in the \"patient instructions.\" Topics included: inheritance pattern, cancer risks, cancer screening recommendations, and also risks, benefits and limitations of testing.      Based on her personal and family history, Lydia meets current National Comprehensive Cancer Network (NCCN) criteria for genetic testing of high-penetrance breast cancer susceptibility genes (including BRCA1, BRCA2, CDH1, PALB2, PTEN, and TP53), because of her personal history of breast cancer, in combination with 3 close relatives with breast cancer.        We discussed that there are additional genes besides those listed above that could cause increased risk for breast (and other) cancer. As many of these genes present with overlapping features in a family and accurate cancer risk cannot always be established based upon the pedigree analysis alone, it would be reasonable for Lydia to consider panel genetic testing to analyze multiple genes at once.      Lydia stated that she was interested in pursuing genetic testing through a panel of genes associated with hereditary cancer syndromes, and so we reviewed the various panel options and their potential benefits and limitations.  After this conversation, Lydia decided that she was interested in BRCA1/BRCA2 analysis with an automatic reflex to the Common Hereditary Cancers + Melanoma genetic testing panels at [facility].      The Common Hereditary Cancers genetic testing panel through [facility] includes analysis for 47 genes associated with cancers of the breast, ovary, uterus, prostate and gastrointestinal system: APC, ARPIT, AXIN2, BARD1, BMPR1A, BRCA1, BRCA2, BRIP1, CDH1, CDK4, CDKN2A, CHEK2, CTNNA1, DICER1, EPCAM, GREM1, HOXB13, KIT, MEN1, MLH1, MSH2, MSH3, MSH6, MUTYH, NBN, NF1, NTHL1, PALB2, PDGFRA, PMS2, POLD1, POLE, PTEN,RAD50, RAD51C, RAD51D, SDHA, SDHB, SDHC, " SDHD, SMAD4, SMARCA4, STK11, TP53,TSC1, TSC2, and VHL.  The Melanoma gene panel also adds on: BAP1, MITF, POT1, and RB1.       We discussed that many of these genes are associated with specific hereditary cancer syndromes and published management guidelines: Hereditary Breast and Ovarian Cancer syndrome (BRCA1, BRCA2), Beauchamp syndrome (MLH1, MSH2, MSH6, PMS2, EPCAM), Familial Adenomatous Polyposis (APC), Hereditary Diffuse Gastric Cancer (CDH1), Familial Atypical Multiple Mole Melanoma syndrome (CDK4, CDKN2A), Multiple Endocrine Neoplasia type 1 (MEN1), Juvenile Polyposis syndrome (BMPR1A, SMAD4), Cowden syndrome (PTEN), Li Fraumeni syndrome (TP53), Hereditary Paraganglioma and Pheochromocytoma syndrome (SDHA, SDHB, SDHC, SDHD), Peutz-Jeghers syndrome (STK11), MUTYH Associated Polyposis (MUTYH), Tuberous sclerosis complex (TSC1, TSC2), Von Hippel-Lindau disease (VHL), Retinoblastoma (RB1) and Neurofibromatosis type 1 (NF1). The ARPIT, AXIN2, BRIP1, BAP1, CHEK2, GREM1, MSH3, NBN, NTHL1, PALB2, POLD1, POLE, RAD51C, and RAD51D genes are associated with increased cancer risk and have published management guidelines for certain cancers. The remaining genes (BARD1, CTNNA1, DICER1, HOXB13, KIT, MITF, POT1, PDGFRA, RAD50, and SMARCA4) are associated with increased cancer risk and may allow us to make medical recommendations when mutations are identified.        Medical Management: For Lydia, we reviewed that the information from genetic testing may determine:    additional cancer screening for which Lydia may qualify (eg. mammogram and breast MRI, more frequent colonoscopies, more frequent dermatologic exams, etc.),    options for risk reducing surgeries Lydia could consider (eg. bilateral mastectomy, surgery to remove her ovaries and/or uterus, etc),      and targeted therapies for Lydia, if she were to develop certain cancers in the future (eg. immunotherapy for individuals with Beauchamp syndrome, PARP inhibitors for HBOC  syndrome, etc.).       These recommendations and possible targeted therapies will be discussed in detail once genetic testing is completed.     Plan:  1) Today, Lydia decided to proceed with a BRCA1/BRCA2 with an automatic reflex to the Common Hereditary Cancers + Melanoma genetic testing panel at [facility].  Therefore, consent was reviewed and verbally obtained for this testing.    2) We reviewed the options for sample collection.  Lydia plans to have her blood drawn at an appointment later this week. Test results are expected to be available within 4-6 weeks of that blood draw.  3) Lydia will have a telephone visit to discuss the results.  Additional recommendations about screening will be made at that time.    Alicia Chacon MS, [facility]  Genetic Counselor  Ph: [phone]    Time spend on the phone: 36 minutes

## 2022-05-11 ENCOUNTER — ONCOLOGY VISIT (OUTPATIENT)
Dept: ONCOLOGY | Facility: CLINIC | Age: 72
End: 2022-05-11
Attending: INTERNAL MEDICINE
Payer: MEDICARE

## 2022-05-11 VITALS
HEART RATE: 73 BPM | DIASTOLIC BLOOD PRESSURE: 77 MMHG | HEIGHT: 61 IN | BODY MASS INDEX: 33.99 KG/M2 | OXYGEN SATURATION: 97 % | WEIGHT: 180 LBS | RESPIRATION RATE: 16 BRPM | SYSTOLIC BLOOD PRESSURE: 120 MMHG | TEMPERATURE: 98.7 F

## 2022-05-11 DIAGNOSIS — Z17.0 MALIGNANT NEOPLASM OF UPPER-OUTER QUADRANT OF LEFT BREAST IN FEMALE, ESTROGEN RECEPTOR POSITIVE (H): ICD-10-CM

## 2022-05-11 DIAGNOSIS — Z80.3 FAMILY HISTORY OF MALIGNANT NEOPLASM OF BREAST: ICD-10-CM

## 2022-05-11 DIAGNOSIS — C50.912 MALIGNANT NEOPLASM OF LEFT FEMALE BREAST, UNSPECIFIED ESTROGEN RECEPTOR STATUS, UNSPECIFIED SITE OF BREAST (H): Primary | ICD-10-CM

## 2022-05-11 DIAGNOSIS — C50.412 MALIGNANT NEOPLASM OF UPPER-OUTER QUADRANT OF LEFT BREAST IN FEMALE, ESTROGEN RECEPTOR POSITIVE (H): ICD-10-CM

## 2022-05-11 DIAGNOSIS — R73.09 ELEVATED GLUCOSE LEVEL: ICD-10-CM

## 2022-05-11 DIAGNOSIS — C50.912 MALIGNANT NEOPLASM OF LEFT FEMALE BREAST, UNSPECIFIED ESTROGEN RECEPTOR STATUS, UNSPECIFIED SITE OF BREAST (H): ICD-10-CM

## 2022-05-11 LAB
ALBUMIN SERPL-MCNC: 4.2 G/DL (ref 3.4–5)
ALP SERPL-CCNC: 71 U/L (ref 40–150)
ALT SERPL W P-5'-P-CCNC: 24 U/L (ref 0–50)
ANION GAP SERPL CALCULATED.3IONS-SCNC: 2 MMOL/L (ref 3–14)
AST SERPL W P-5'-P-CCNC: 17 U/L (ref 0–45)
BILIRUB SERPL-MCNC: 0.8 MG/DL (ref 0.2–1.3)
BUN SERPL-MCNC: 10 MG/DL (ref 7–30)
CALCIUM SERPL-MCNC: 9.7 MG/DL (ref 8.5–10.1)
CHLORIDE BLD-SCNC: 108 MMOL/L (ref 94–109)
CO2 SERPL-SCNC: 27 MMOL/L (ref 20–32)
CREAT SERPL-MCNC: 0.66 MG/DL (ref 0.52–1.04)
ERYTHROCYTE [DISTWIDTH] IN BLOOD BY AUTOMATED COUNT: 13.6 % (ref 10–15)
GFR SERPL CREATININE-BSD FRML MDRD: >90 ML/MIN/1.73M2
GLUCOSE BLD-MCNC: 105 MG/DL (ref 70–99)
HBA1C MFR BLD: 5.4 % (ref 0–5.6)
HCT VFR BLD AUTO: 40.1 % (ref 35–47)
HGB BLD-MCNC: 12.8 G/DL (ref 11.7–15.7)
MCH RBC QN AUTO: 30 PG (ref 26.5–33)
MCHC RBC AUTO-ENTMCNC: 31.9 G/DL (ref 31.5–36.5)
MCV RBC AUTO: 94 FL (ref 78–100)
PLATELET # BLD AUTO: 275 10E3/UL (ref 150–450)
POTASSIUM BLD-SCNC: 4.6 MMOL/L (ref 3.4–5.3)
PROT SERPL-MCNC: 7.3 G/DL (ref 6.8–8.8)
RBC # BLD AUTO: 4.27 10E6/UL (ref 3.8–5.2)
SODIUM SERPL-SCNC: 137 MMOL/L (ref 133–144)
WBC # BLD AUTO: 5.2 10E3/UL (ref 4–11)

## 2022-05-11 PROCEDURE — G0463 HOSPITAL OUTPT CLINIC VISIT: HCPCS

## 2022-05-11 PROCEDURE — 36415 COLL VENOUS BLD VENIPUNCTURE: CPT

## 2022-05-11 PROCEDURE — 83036 HEMOGLOBIN GLYCOSYLATED A1C: CPT | Performed by: INTERNAL MEDICINE

## 2022-05-11 PROCEDURE — 85027 COMPLETE CBC AUTOMATED: CPT | Performed by: INTERNAL MEDICINE

## 2022-05-11 PROCEDURE — 80053 COMPREHEN METABOLIC PANEL: CPT | Performed by: INTERNAL MEDICINE

## 2022-05-11 PROCEDURE — 99214 OFFICE O/P EST MOD 30 MIN: CPT | Performed by: INTERNAL MEDICINE

## 2022-05-11 ASSESSMENT — PAIN SCALES - GENERAL: PAINLEVEL: NO PAIN (1)

## 2022-05-11 NOTE — NURSING NOTE
"Oncology Rooming Note    May 11, 2022 9:08 AM   Lydia Ac is a 72 year old female who presents for:    Chief Complaint   Patient presents with     Oncology Clinic Visit     Breast cancer screening, high risk patient     Initial Vitals: /77   Pulse 73   Temp 98.7  F (37.1  C) (Tympanic)   Resp 16   Ht 1.549 m (5' 1\")   Wt 81.6 kg (180 lb)   SpO2 97%   BMI 34.01 kg/m   Estimated body mass index is 34.01 kg/m  as calculated from the following:    Height as of this encounter: 1.549 m (5' 1\").    Weight as of this encounter: 81.6 kg (180 lb). Body surface area is 1.87 meters squared.  No Pain (1) Comment: Data Unavailable   No LMP recorded. Patient is postmenopausal.  Allergies reviewed: Yes  Medications reviewed: Yes    Medications: Medication refills not needed today.  Pharmacy name entered into [employer]:    [id number] - [place], [place] - [address] AT [facility] [place] & [address] [id number] - [place], [place] - [address] [id number] - [place], [place] - [address] [facility]    Clinical concerns: f/u       Michelle Gore CMA              "

## 2022-05-11 NOTE — PROGRESS NOTES
Medical Assistant Note:  Lydia Ac presents today for blood draw.    Patient seen by provider today: Yes: Dr. Harris.   present during visit today: Not Applicable.    Concerns: No Concerns.    Procedure:  Labs drawn    Post Assessment:  Labs drawn without difficulty: Yes.    Discharge Plan:  Departure Mode: Ambulatory.    Face to Face Time: 10 min.    Katherine Teague CMA

## 2022-05-11 NOTE — LETTER
5/11/2022         RE: Lydia Ac  [address]  [place] [address]        Dear Colleague,    Thank you for referring your patient, Lydia Ac, to the [facility]. Please see a copy of my visit note below.    Visit Date: 05/11/2022    Lydia Ac is a 72-year-old patient who is here today for interim followup.    CHIEF COMPLAINT:     1.  Left-sided DCIS.  2.  Left-sided atypical ductal hyperplasia.    HISTORY OF PRESENTING COMPLAINT:  Lydia is a 72-year-old postmenopausal patient who has a diagnosis of DCIS and atypical ductal hyperplasia.  She was diagnosed when she had a left-sided ultrasound biopsy in 10/2021, and she had a lumpectomy done in 11/2021.  She had a low-grade DCIS and some atypical ductal hyperplasia.  DCIS measured approximately 8-9 mm in total, completely excised, and she decided after lumpectomy not to proceed with any radiation treatment, so we discussed adjuvant hormonal therapy and she is now on adjuvant anastrozole.  She comes in today for interim followup.  She seems to be tolerating the anastrozole actually quite well.  She has only got minor side effects from it.  She denies today any cough, shortness of breath, bone pain, or worrisome symptomatology.    REVIEW OF SYSTEMS:  A 12-point comprehensive review of systems is otherwise unremarkable.    SOCIAL HISTORY:  The patient lives alone.  She has 3 children and 1 daughter.    PAST SURGICAL HISTORY:  History of lumpectomies, 2 on the left, 2 on the right, history of bunionectomy.    SOCIAL HISTORY:  The patient is a retired .  She is a nonsmoker, drinks alcohol socially.    PAIN ASSESSMENT:  She is in no pain today.    PHYSICAL EXAMINATION:    GENERAL:  She is well-appearing lady in no acute distress.  VITAL SIGNS:  Stable.  NECK:  No masses or goiter.  CHEST:  Clear to auscultation and percussion bilaterally.  HEART:  Sounds 1, 2 normal.  No added sounds, no  murmurs.  BREASTS:  The patient is status post left-sided lumpectomy, no palpable masses.  Left axilla negative.  She has had a previous right-sided lumpectomy.  No palpable masses.  Right axilla negative.  GASTROINTESTINAL:  Abdomen soft and nontender.  No hepatosplenomegaly.  EXTREMITIES:  Legs without tenderness or edema.  PERIPHERAL NEUROLOGIC:  Grossly intact.    DATA REVIEW:  Liver function test within normal limits.  Creatinine 0.66.  She has had a high-elevated glucose in the past.  We did a hemoglobin A1c, which was 5.4.  Glucose today is 105.  White cell count 5.2, hemoglobin 12.8, platelets 275.  A DEXA scan was done as a baseline, which showed some mild osteopenia.    IMPRESSION:  This is a 72-year-old patient with a diagnosis of left-sided DCIS (ductal carcinoma in situ) and atypical ductal hyperplasia completely excised.  She is now on adjuvant anastrozole.  From a toxicity standpoint, she is tolerating it well.  We will keep an eye on the bone density.  Because of her family history, she has also met with our Genetics team and proceeded with testing in the last few days, and we will wait for those results.    Alma Harris MD        D: 2022   T: 2022   MT: [facility]    Name:     FLAKITA SANDY  MRN:      [id number]        Account:    [id number]   :      1950           Visit Date: 2022     Document: [id number]      Again, thank you for allowing me to participate in the care of your patient.        Sincerely,        Alma Harris MD

## 2022-05-12 NOTE — PROGRESS NOTES
Visit Date: 05/11/2022    Lydia Ac is a 72-year-old patient who is here today for interim followup.    CHIEF COMPLAINT:     1.  Left-sided DCIS.  2.  Left-sided atypical ductal hyperplasia.    HISTORY OF PRESENTING COMPLAINT:  Lydia is a 72-year-old postmenopausal patient who has a diagnosis of DCIS and atypical ductal hyperplasia.  She was diagnosed when she had a left-sided ultrasound biopsy in 10/2021, and she had a lumpectomy done in 11/2021.  She had a low-grade DCIS and some atypical ductal hyperplasia.  DCIS measured approximately 8-9 mm in total, completely excised, and she decided after lumpectomy not to proceed with any radiation treatment, so we discussed adjuvant hormonal therapy and she is now on adjuvant anastrozole.  She comes in today for interim followup.  She seems to be tolerating the anastrozole actually quite well.  She has only got minor side effects from it.  She denies today any cough, shortness of breath, bone pain, or worrisome symptomatology.    REVIEW OF SYSTEMS:  A 12-point comprehensive review of systems is otherwise unremarkable.    SOCIAL HISTORY:  The patient lives alone.  She has 3 children and 1 daughter.    PAST SURGICAL HISTORY:  History of lumpectomies, 2 on the left, 2 on the right, history of bunionectomy.    SOCIAL HISTORY:  The patient is a retired .  She is a nonsmoker, drinks alcohol socially.    PAIN ASSESSMENT:  She is in no pain today.    PHYSICAL EXAMINATION:    GENERAL:  She is well-appearing lady in no acute distress.  VITAL SIGNS:  Stable.  NECK:  No masses or goiter.  CHEST:  Clear to auscultation and percussion bilaterally.  HEART:  Sounds 1, 2 normal.  No added sounds, no murmurs.  BREASTS:  The patient is status post left-sided lumpectomy, no palpable masses.  Left axilla negative.  She has had a previous right-sided lumpectomy.  No palpable masses.  Right axilla negative.  GASTROINTESTINAL:  Abdomen soft and nontender.  No  hepatosplenomegaly.  EXTREMITIES:  Legs without tenderness or edema.  PERIPHERAL NEUROLOGIC:  Grossly intact.    DATA REVIEW:  Liver function test within normal limits.  Creatinine 0.66.  She has had a high-elevated glucose in the past.  We did a hemoglobin A1c, which was 5.4.  Glucose today is 105.  White cell count 5.2, hemoglobin 12.8, platelets 275.  A DEXA scan was done as a baseline, which showed some mild osteopenia.    IMPRESSION:  This is a 72-year-old patient with a diagnosis of left-sided DCIS (ductal carcinoma in situ) and atypical ductal hyperplasia completely excised.  She is now on adjuvant anastrozole.  From a toxicity standpoint, she is tolerating it well.  We will keep an eye on the bone density.  Because of her family history, she has also met with our Genetics team and proceeded with testing in the last few days, and we will wait for those results.    Alma Harris MD        D: 2022   T: 2022   MT: [facility]    Name:     FLAKITA SANDYBryce  MRN:      [id number]        Account:    [id number]   :      1950           Visit Date: 2022     Document: [id number]

## 2022-05-17 ENCOUNTER — HOSPITAL ENCOUNTER (OUTPATIENT)
Dept: MAMMOGRAPHY | Facility: CLINIC | Age: 72
Discharge: HOME | End: 2022-05-17
Attending: INTERNAL MEDICINE | Admitting: INTERNAL MEDICINE
Payer: MEDICARE

## 2022-05-17 DIAGNOSIS — C50.412 MALIGNANT NEOPLASM OF UPPER-OUTER QUADRANT OF LEFT BREAST IN FEMALE, ESTROGEN RECEPTOR POSITIVE (H): ICD-10-CM

## 2022-05-17 DIAGNOSIS — C50.912 MALIGNANT NEOPLASM OF LEFT FEMALE BREAST, UNSPECIFIED ESTROGEN RECEPTOR STATUS, UNSPECIFIED SITE OF BREAST (H): ICD-10-CM

## 2022-05-17 DIAGNOSIS — Z17.0 MALIGNANT NEOPLASM OF UPPER-OUTER QUADRANT OF LEFT BREAST IN FEMALE, ESTROGEN RECEPTOR POSITIVE (H): ICD-10-CM

## 2022-05-17 PROCEDURE — 77062 BREAST TOMOSYNTHESIS BI: CPT

## 2022-05-23 NOTE — PATIENT INSTRUCTIONS
RTC MD 6 months -- 11/15/22 at 10am    Mahnaz Lopez, RN, BSN, OCN  Nurse Care Coordinator  [facility] [place]  P: [phone]     F: [phone]

## 2022-05-31 ENCOUNTER — VIRTUAL VISIT (OUTPATIENT)
Dept: ONCOLOGY | Facility: CLINIC | Age: 72
End: 2022-05-31
Attending: GENETIC COUNSELOR, MS
Payer: MEDICARE

## 2022-05-31 DIAGNOSIS — C50.912 MALIGNANT NEOPLASM OF LEFT FEMALE BREAST, UNSPECIFIED ESTROGEN RECEPTOR STATUS, UNSPECIFIED SITE OF BREAST (H): Primary | ICD-10-CM

## 2022-05-31 DIAGNOSIS — Z80.3 FAMILY HISTORY OF MALIGNANT NEOPLASM OF BREAST: ICD-10-CM

## 2022-05-31 PROCEDURE — 999N000069 HC STATISTIC GENETIC COUNSELING, < 16 MIN: Mod: TEL | Performed by: GENETIC COUNSELOR, MS

## 2022-05-31 NOTE — LETTER
Cancer Risk Management  Program Locations    [facility]  [facility]  [facility]  [facility]  Mailing Address  Cancer Risk Management Program  [address]  [place] [address]    New patient appointments  [phone]  June 2, 2022    Lydia Ac  [address] [facility] [address]      Dear Lydia,    It was a talking with you again on 5-. Here is a copy of the progress note from your recent genetic counseling visit to the Cancer Risk Management Program. If you have any additional questions, please feel free to contact me.    Cancer Risk Management Program Genetic Counseling Note    5/31/2022    Referring Provider:  Dr. Alma Harris    Presenting Information:  Lydia had a return telephone visit through the Cancer Risk Management Program today, in order to discuss her genetic testing results. Her blood was drawn for this tesitng on May 6, 2022.  A Common Hereditary Cancers genetic testing panel was ordered through [facility] [facility] [employer]. This testing was done because of her personal history of breast cancer and her family history of breast and other cancer.    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 This variant is called c.[id number] (also known as [id number]) . No other variants or mutations were found in the TSC1 gene. Given the uncertain significance of this result, medical management decisions should NOT be made based on this test result alone.    Of note, Lydia tested negative for mutations in the following genes: APC, ARPIT, AXIN2, BARD1, BMPR1A, BRCA1, BRCA2, BRIP1, CDH1, CDK4, CDKN2A, CHEK2, CTNNA1, DICER1, EPCAM, GREM1, HOXB13, KIT, MEN1, MLH1, MSH2, MSH3, MSH6, MUTYH, NBN, NF1, NTHL1, PALB2, PDGFRA, PMS2, POLD1,  "POLE, PTEN, RAD50, RAD51C, RAD51D, SDHA, SDHB, SDHC, SDHD, SMAD4, SMARCA4, STK11, TP53, TSC2, and VHL.      Therefore, genetic testing did not detect an identifiable mutation associated with Hereditary Breast and Ovarian Cancer syndrome (BRCA1, BRCA2), Beauchamp syndrome (MLH1, MSH2, MSH6, PMS2, EPCAM), Familial Adenomatous Polyposis (APC), Hereditary Diffuse Gastric Cancer (CDH1), Familial Atypical Multiple Mole Melanoma syndrome (CDK4, CDKN2A), Juvenile Polyposis syndrome (BMPR1A, SMAD4), Cowden syndrome (PTEN), Li Fraumeni syndrome (TP53), Peutz-Jeghers syndrome (STK11), MUTYH Associated Polyposis (MUTYH), Hereditary Paraganglioma and Pheochromocytoma syndrome (SDHA, SDHB, SDHC, SDHD), Von Hippel-Lindau disease (VHL), Neurofibromatosis type 1 (NF1), and Multiple Endocrine Neoplasia type 1 (MEN1).    A copy of the test report can be found in the Laboratory tab, dated 5-6-2022, and named \"LABORATORY MISCELLANEOUS ORDER.\" The report is scanned in as a linked document.    Interpretation:  We discussed several different interpretations of this test result:      We talked about that Lydia's genetic testing did not identify a gene variant known to be associated with an increased risk for breast cancer. One explanation may be that there is a different gene or combination of genes and environment that are associated with the cancers in this family.  Another possible explanation could be that Lydia's breast cancer was a \"sporadic\" breast cancer that was not related to a single, specific mutation in a hereditary cancer gene.      It is not clear at this time if this variant in the TSCI gene is associated with increased cancer risk:  1. This variant may be a benign change that does not increase cancer risk.  2. This variant may be a harmful mutation that causes an increased risk for cancer and/or the other features of Tuberous Sclerosis Complex.    We talked about that a harmful mutation in the TSC1 gene causes Tuberous " "Sclerosis Complex, which is a condition that is associated with increased risk for certain physical, medical, and developmental features.  Each individual with Tuberous Sclerosis Complex can present with very varied symptoms (even within the same family), with some individuals having both significant medical and cognitive issues, while others presenting with very few symptoms.  One of the most common symptoms of Tuberous Sclerosis Complex are differences in the skin (eg. hypopigmented spots, \"confetti\" skin lesions, facial angiofibromas, shagreen patches, and fibromas around the finger/toenails).  Seizures are a common feature of this condition, and at least half of individuals with this condition have some type of intellectual disability, autism spectrum disorder, and/or behavior differences.  Various kidney abnormalities are more common also (eg. angiomyolipomas and cysts). With respect to cancer risks, individuals with Tuberous Sclerosis Complex have been noted to have an increased risk for some types of kidney cancers and brain tumors and heart tumors.      Again, it is not known at this time if the TSC1 variant seen in Lydia is a harmful mutation or, instead, a benign variation of normal.  Of note, Lydia does not report any personal or family history consistent with a diagnosis of Tuberous Sclerosis Complex.  Genetic testing labs are continuing to collect data to determine if this variant is harmful or benign, and they will contact me if it is reclassified. If this variant is determined to be a benign change, there may be a different gene or combination of genes and environment that are associated with the cancers in this family.    Inheritance:  We reviewed the inheritance of this variant in the TSC1 gene. Lydia had a 50% chance to pass on a copy of this variant to each of her children. Likewise, assuming that Lydia inherited this TSC1 variant from one of her parents, each of her siblings had a 50% risk of " inheriting a copy of the same variant. Because it is unclear what, if any, risk is associated with this variant, clinical genetic testing for this TSCI1 variant alone is not recommended for relatives.    On the other hand, for the genes that Lydia tested negative for, we would expect that Lydia did not pass on a mutation in any of these genes to her children. Mutations in these genes do not skip generations.      Screening:  Based on this test result, it is important for Lydia and her relatives to refer back to the family history for appropriate cancer screening:      We talked about that Lydia should continue to follow-up with her oncology team as previously recommended.  We talked about that based on what is currently known about her family history (and her negative genetic testing results), there are not additional increased cancer screening recommendations for Lydia at this time (beyond what is recommended for her breast cancer surveillance).      We talked about that Lydia's sisters (and other close maternal relatives) may still be at somewhat increased risk for breast cancer, even though no specific genetic breast cancer risk factor was identified on Lydia's test.  Lydia's sisters (and daughter) should talk with their primary care providers about their family history of breast cancer, in order to determine what breast cancer screening may be most appropriate for them.  Since the breast cancers in the family have been diagnosed at later than age 60, there is not an indication for Lydia's daughter (or other close female relatives) to start breast cancer screening before age 40 based on family history alone.      Other population cancer screening options, such as those recommended by the American Cancer Society and the National Comprehensive Cancer Network (NCCN), are also appropriate for Lydia and her family. These screening recommendations may change if there are changes to Lydia's personal and/or family  "history of cancer. Final screening recommendations should be made by each individual's primary care provider.    Additional Testing Considerations:  It is possible Lydia does carry a gene or combination of genes and environment that increase her risk for cancer that was not identifiable through this particular genetic testing panel. Lydia is encouraged to contact me in the future if she wants to know if there is additional genetic testing that might be available/indicated for her then or if she wishes to readdress larger gene panel options in the future.     Summary:  We do not have an explanation for Lydia's personal and family history of breast cancer. While no genetic changes were identified, Lydia may still be at risk for certain cancers due to family history, environmental factors, or other genetic causes not identified by this test.  Because of that, it is important that she continue with cancer screening based on her personal and family history as discussed above.    Genetic testing is rapidly advancing, and new cancer susceptibility genes will most likely be identified in the future. Therefore, I encouraged Lydia to contact me annually or if there are changes in her personal or family history. This may change how we assess her cancer risk, screening, and the testing we would offer.    Plan:  1.  I released a copy of Lydia's genetic testing report to her through the [facility] portal today.  She will also get a copy of this clinic note and a handout about a \"variant of unknown significance\" result (see \"patient instructions\").  2.  Lydia plans to follow-up with her oncology team and her primary care provider, as previously recommended.  3.  Lydia should contact me periodically, or if her family history changes, and she would like to know if there are additional screening or testing recommendations at that time.  4.  I will try to contact Lydia if the laboratory informs me that this VUS has been reclassified.  " This may change screening and testing recommendations for Lydia and her relatives.    If Lydia has any further questions, I encouraged her to contact me via [employer] or through email: [email or web address].    Alicia Chacon MS, [facility]  Genetic Counselor    Time spent over the phone: 8 minutes

## 2022-05-31 NOTE — PROGRESS NOTES
Lydia is a 72 year old who is being evaluated via a billable video visit.      Pt is in [place]    How would you like to obtain your AVS? MyChart  If the video visit is dropped, the invitation should be resent by: Text to cell phone: [phone]      Due to technical issues, Lydia's visit hd to be switched to a telephone visit.  See provider note for details

## 2022-05-31 NOTE — LETTER
5/31/2022         RE: Lydia Ac  [address]  [place] [address]        Dear Colleague,    Thank you for referring your patient, Lydia Ac, to the [facility]. Please see a copy of my visit note below.    Lydia is a 72 year old who is being evaluated via a billable video visit.      Pt is in [place]    How would you like to obtain your AVS? MyChart  If the video visit is dropped, the invitation should be resent by: Text to cell phone: [phone]      Due to technical issues, Lydia's visit hd to be switched to a telephone visit.  See provider note for details    Cancer Risk Management Program Genetic Counseling Note    5/31/2022    Referring Provider:  Dr. Alma Harris    Presenting Information:  Lydia had a return telephone visit through the Cancer Risk Management Program today, in order to discuss her genetic testing results. Her blood was drawn for this tesitng on May 6, 2022.  A Common Hereditary Cancers genetic testing panel was ordered through [facility]. This testing was done because of her personal history of breast cancer and her family history of breast and other cancer.    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 This variant is called c.1811A>G (also known as p.[id number]) . No other variants or mutations were found in the TSC1 gene. Given the uncertain significance of this result, medical management decisions should NOT be made based on this test result alone.    Of note, Lydia tested negative for mutations in the following genes: APC, ARPIT, AXIN2, BARD1, BMPR1A, BRCA1, BRCA2, BRIP1, CDH1, CDK4, CDKN2A, CHEK2, CTNNA1, DICER1, EPCAM, GREM1, HOXB13, KIT, MEN1, MLH1, MSH2, MSH3, MSH6, MUTYH, NBN, NF1, NTHL1, PALB2, PDGFRA, PMS2, POLD1, POLE, PTEN, RAD50, RAD51C, RAD51D, SDHA, SDHB, SDHC, SDHD, SMAD4, SMARCA4, STK11, TP53, TSC2, and VHL.   "    Therefore, genetic testing did not detect an identifiable mutation associated with Hereditary Breast and Ovarian Cancer syndrome (BRCA1, BRCA2), Beauchamp syndrome (MLH1, MSH2, MSH6, PMS2, EPCAM), Familial Adenomatous Polyposis (APC), Hereditary Diffuse Gastric Cancer (CDH1), Familial Atypical Multiple Mole Melanoma syndrome (CDK4, CDKN2A), Juvenile Polyposis syndrome (BMPR1A, SMAD4), Cowden syndrome (PTEN), Li Fraumeni syndrome (TP53), Peutz-Jeghers syndrome (STK11), MUTYH Associated Polyposis (MUTYH), Hereditary Paraganglioma and Pheochromocytoma syndrome (SDHA, SDHB, SDHC, SDHD), Von Hippel-Lindau disease (VHL), Neurofibromatosis type 1 (NF1), and Multiple Endocrine Neoplasia type 1 (MEN1).    A copy of the test report can be found in the Laboratory tab, dated 5-6-2022, and named \"LABORATORY MISCELLANEOUS ORDER.\" The report is scanned in as a linked document.    Interpretation:  We discussed several different interpretations of this test result:      We talked about that Lydia's genetic testing did not identify a gene variant known to be associated with an increased risk for breast cancer. One explanation may be that there is a different gene or combination of genes and environment that are associated with the cancers in this family.  Another possible explanation could be that Lydia's breast cancer was a \"sporadic\" breast cancer that was not related to a single, specific mutation in a hereditary cancer gene.      It is not clear at this time if this variant in the TSCI gene is associated with increased cancer risk:  1. This variant may be a benign change that does not increase cancer risk.  2. This variant may be a harmful mutation that causes an increased risk for cancer and/or the other features of Tuberous Sclerosis Complex.    We talked about that a harmful mutation in the TSC1 gene causes Tuberous Sclerosis Complex, which is a condition that is associated with increased risk for certain physical, medical, " "and developmental features.  Each individual with Tuberous Sclerosis Complex can present with very varied symptoms (even within the same family), with some individuals having both significant medical and cognitive issues, while others presenting with very few symptoms.  One of the most common symptoms of Tuberous Sclerosis Complex are differences in the skin (eg. hypopigmented spots, \"confetti\" skin lesions, facial angiofibromas, shagreen patches, and fibromas around the finger/toenails).  Seizures are a common feature of this condition, and at least half of individuals with this condition have some type of intellectual disability, autism spectrum disorder, and/or behavior differences.  Various kidney abnormalities are more common also (eg. angiomyolipomas and cysts). With respect to cancer risks, individuals with Tuberous Sclerosis Complex have been noted to have an increased risk for some types of kidney cancers and brain tumors and heart tumors.      Again, it is not known at this time if the TSC1 variant seen in Lydia is a harmful mutation or, instead, a benign variation of normal.  Of note, Lydia does not report any personal or family history consistent with a diagnosis of Tuberous Sclerosis Complex.  Genetic testing labs are continuing to collect data to determine if this variant is harmful or benign, and they will contact me if it is reclassified. If this variant is determined to be a benign change, there may be a different gene or combination of genes and environment that are associated with the cancers in this family.    Inheritance:  We reviewed the inheritance of this variant in the TSC1 gene. Lydia had a 50% chance to pass on a copy of this variant to each of her children. Likewise, assuming that Lydia inherited this TSC1 variant from one of her parents, each of her siblings had a 50% risk of inheriting a copy of the same variant. Because it is unclear what, if any, risk is associated with this variant, " clinical genetic testing for this TSCI1 variant alone is not recommended for relatives.    On the other hand, for the genes that Lydia tested negative for, we would expect that Lydia did not pass on a mutation in any of these genes to her children. Mutations in these genes do not skip generations.      Screening:  Based on this test result, it is important for Lydia and her relatives to refer back to the family history for appropriate cancer screening:      We talked about that Lydia should continue to follow-up with her oncology team as previously recommended.  We talked about that based on what is currently known about her family history (and her negative genetic testing results), there are not additional increased cancer screening recommendations for Lydia at this time (beyond what is recommended for her breast cancer surveillance).      We talked about that Lydia's sisters (and other close maternal relatives) may still be at somewhat increased risk for breast cancer, even though no specific genetic breast cancer risk factor was identified on Lydia's test.  Lydia's sisters (and daughter) should talk with their primary care providers about their family history of breast cancer, in order to determine what breast cancer screening may be most appropriate for them.  Since the breast cancers in the family have been diagnosed at later than age 60, there is not an indication for Lydia's daughter (or other close female relatives) to start breast cancer screening before age 40 based on family history alone.      Other population cancer screening options, such as those recommended by the American Cancer Society and the National Comprehensive Cancer Network (NCCN), are also appropriate for Lydia and her family. These screening recommendations may change if there are changes to Lydia's personal and/or family history of cancer. Final screening recommendations should be made by each individual's primary care  "provider.    Additional Testing Considerations:  It is possible Lydia does carry a gene or combination of genes and environment that increase her risk for cancer that was not identifiable through this particular genetic testing panel. Lydia is encouraged to contact me in the future if she wants to know if there is additional genetic testing that might be available/indicated for her then or if she wishes to readdress larger gene panel options in the future.     Summary:  We do not have an explanation for Lydia's personal and family history of breast cancer. While no genetic changes were identified, Lydia may still be at risk for certain cancers due to family history, environmental factors, or other genetic causes not identified by this test.  Because of that, it is important that she continue with cancer screening based on her personal and family history as discussed above.    Genetic testing is rapidly advancing, and new cancer susceptibility genes will most likely be identified in the future. Therefore, I encouraged Lydia to contact me annually or if there are changes in her personal or family history. This may change how we assess her cancer risk, screening, and the testing we would offer.    Plan:  1.  I released a copy of Lydia's genetic testing report to her through the [employer] portal today.  She will also get a copy of this clinic note and a handout about a \"variant of unknown significance\" result (see \"patient instructions\").  2.  Lydia plans to follow-up with her oncology team and her primary care provider, as previously recommended.  3.  Lydia should contact me periodically, or if her family history changes, and she would like to know if there are additional screening or testing recommendations at that time.  4.  I will try to contact Lydia if the laboratory informs me that this VUS has been reclassified.  This may change screening and testing recommendations for Lydia and her relatives.    If Lydia has any " further questions, I encouraged her to contact me via [employer] or through email: [email or web address].    Alicia Chacon MS, [facility]  Genetic Counselor    Time spent over the phone: 8 minutes

## 2022-06-02 NOTE — PATIENT INSTRUCTIONS
Genetic Testing  You had a blood test that looked at the genetic information in one or more genes associated with increased cancer risk.  The testing looked for any harmful changes that would stop this particular gene from working like it should.  It is important to remember that everyone has variants in multiple genes in their bodies that do not affect how the gene works.  These changes are benign and do not cause disease or increase cancer risk.  The term often used to describe these variants is benign polymorphism.  If an individual is found to have a benign polymorphism, their genetic test result is reported as Negative.    Results  There are three possible results of any genetic test:  Positive--a harmful mutation was identified  Negative--no mutation was identified  Variant of unknown significance--a variation in one of the genes was identified, but it is unclear how this impacts cancer risk in the family    Variant of Unknown Significance (VUS)  A VUS was identified in your blood sample.  Scientists currently do not know if this variant is harmful or if it is a benign polymorphism not associated with any increased risk of cancer.   There are several reasons for this lack of knowledge, but likely your VUS has either never been seen before or has only been seen in a small number of individuals.  Until your VUS is studied in more detail and/or seen in more families, scientists are not able to predict if it is a harmful mutation or a benign polymorphism.  Therefore, the cause of the cancer in you and your relatives is still unknown.          Reclassification  Genetic testing laboratories and researchers are constantly working to determine the importance of variants of unknown significance.  Most laboratories will notify the genetic counselor when a patient s VUS has been reclassified as a harmful mutation or a benign polymorphism.      Some families may be candidates for participation in the laboratory s  variant research programs to help determine the importance of their VUS.  Only the testing laboratory can decide who is eligible for participation.  Participating in these programs does not guarantee that families will learn the significance of their VUS immediately.  It could be months or years before a VUS is reclassified.       Screening Recommendations  Cancer screening recommendations for patients with a VUS should be based on their personal and family history rather than the VUS itself.  This may include increased cancer screening for certain individuals in the family.  Your genetic counselor and health care provider can help make appropriate recommendations for you and your relatives.      It is usually not recommended that other relatives have genetic testing for the VUS unless it is done as part of the laboratory s variant research program because an individual s test results should not influence their cancer screening until we determine the importance of the VUS.  Your genetic counselor can help you and your relatives understand the risks and benefits of all genetic testing and cancer screening options.    Please call us if you have any questions or concerns.     Cancer Risk Management Program [phone]-CANCER ([phone])  Juarez Byers, [place] [facility] [place] [phone]  Anjelica Palacios, MS, [facility] [place] [phone]  BRIAN Chacon, MS, [facility] [place]  [phone]   [email or web address]  Annette Hagan, MS, [facility] [place]  [phone]  Genesis Garcia, MS, [facility] [place]  [phone]  Luana Kelly, MS, [facility] [place] [phone]  Princess Mc, MS, [facility] [place] [phone]

## 2022-06-02 NOTE — PROGRESS NOTES
Cancer Risk Management Program Genetic Counseling Note    5/31/2022    Referring Provider:  Dr. Alma Harris    Presenting Information:  Lydia had a return telephone visit through the Cancer Risk Management Program today, in order to discuss her genetic testing results. Her blood was drawn for this tesitng on May 6, 2022.  A Common Hereditary Cancers genetic testing panel was ordered through [facility]. This testing was done because of her personal history of breast cancer and her family history of breast and other cancer.    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 This variant is called c.1811A>G (also known as p.[id number]) . No other variants or mutations were found in the TSC1 gene. Given the uncertain significance of this result, medical management decisions should NOT be made based on this test result alone.    Of note, Lydia tested negative for mutations in the following genes: APC, ARPIT, AXIN2, BARD1, BMPR1A, BRCA1, BRCA2, BRIP1, CDH1, CDK4, CDKN2A, CHEK2, CTNNA1, DICER1, EPCAM, GREM1, HOXB13, KIT, MEN1, MLH1, MSH2, MSH3, MSH6, MUTYH, NBN, NF1, NTHL1, PALB2, PDGFRA, PMS2, POLD1, POLE, PTEN, RAD50, RAD51C, RAD51D, SDHA, SDHB, SDHC, SDHD, SMAD4, SMARCA4, STK11, TP53, TSC2, and VHL.      Therefore, genetic testing did not detect an identifiable mutation associated with Hereditary Breast and Ovarian Cancer syndrome (BRCA1, BRCA2), Beauchamp syndrome (MLH1, MSH2, MSH6, PMS2, EPCAM), Familial Adenomatous Polyposis (APC), Hereditary Diffuse Gastric Cancer (CDH1), Familial Atypical Multiple Mole Melanoma syndrome (CDK4, CDKN2A), Juvenile Polyposis syndrome (BMPR1A, SMAD4), Cowden syndrome (PTEN), Li Fraumeni syndrome (TP53), Peutz-Jeghers syndrome (STK11), MUTYH Associated Polyposis (MUTYH), Hereditary Paraganglioma and Pheochromocytoma syndrome (SDHA, SDHB, SDHC, SDHD), Von Hippel-Lindau disease (VHL),  "Neurofibromatosis type 1 (NF1), and Multiple Endocrine Neoplasia type 1 (MEN1).    A copy of the test report can be found in the Laboratory tab, dated 5-6-2022, and named \"LABORATORY MISCELLANEOUS ORDER.\" The report is scanned in as a linked document.    Interpretation:  We discussed several different interpretations of this test result:      We talked about that Lydia's genetic testing did not identify a gene variant known to be associated with an increased risk for breast cancer. One explanation may be that there is a different gene or combination of genes and environment that are associated with the cancers in this family.  Another possible explanation could be that Lydia's breast cancer was a \"sporadic\" breast cancer that was not related to a single, specific mutation in a hereditary cancer gene.      It is not clear at this time if this variant in the TSCI gene is associated with increased cancer risk:  1. This variant may be a benign change that does not increase cancer risk.  2. This variant may be a harmful mutation that causes an increased risk for cancer and/or the other features of Tuberous Sclerosis Complex.    We talked about that a harmful mutation in the TSC1 gene causes Tuberous Sclerosis Complex, which is a condition that is associated with increased risk for certain physical, medical, and developmental features.  Each individual with Tuberous Sclerosis Complex can present with very varied symptoms (even within the same family), with some individuals having both significant medical and cognitive issues, while others presenting with very few symptoms.  One of the most common symptoms of Tuberous Sclerosis Complex are differences in the skin (eg. hypopigmented spots, \"confetti\" skin lesions, facial angiofibromas, shagreen patches, and fibromas around the finger/toenails).  Seizures are a common feature of this condition, and at least half of individuals with this condition have some type of " intellectual disability, autism spectrum disorder, and/or behavior differences.  Various kidney abnormalities are more common also (eg. angiomyolipomas and cysts). With respect to cancer risks, individuals with Tuberous Sclerosis Complex have been noted to have an increased risk for some types of kidney cancers and brain tumors and heart tumors.      Again, it is not known at this time if the TSC1 variant seen in Lydia is a harmful mutation or, instead, a benign variation of normal.  Of note, Lydia does not report any personal or family history consistent with a diagnosis of Tuberous Sclerosis Complex.  Genetic testing labs are continuing to collect data to determine if this variant is harmful or benign, and they will contact me if it is reclassified. If this variant is determined to be a benign change, there may be a different gene or combination of genes and environment that are associated with the cancers in this family.    Inheritance:  We reviewed the inheritance of this variant in the TSC1 gene. Lydia had a 50% chance to pass on a copy of this variant to each of her children. Likewise, assuming that Lydia inherited this TSC1 variant from one of her parents, each of her siblings had a 50% risk of inheriting a copy of the same variant. Because it is unclear what, if any, risk is associated with this variant, clinical genetic testing for this TSCI1 variant alone is not recommended for relatives.    On the other hand, for the genes that Lydia tested negative for, we would expect that Lydia did not pass on a mutation in any of these genes to her children. Mutations in these genes do not skip generations.      Screening:  Based on this test result, it is important for Lydia and her relatives to refer back to the family history for appropriate cancer screening:      We talked about that Lydia should continue to follow-up with her oncology team as previously recommended.  We talked about that based on what is  currently known about her family history (and her negative genetic testing results), there are not additional increased cancer screening recommendations for Lydia at this time (beyond what is recommended for her breast cancer surveillance).      We talked about that Lydia's sisters (and other close maternal relatives) may still be at somewhat increased risk for breast cancer, even though no specific genetic breast cancer risk factor was identified on Lydia's test.  Lydia's sisters (and daughter) should talk with their primary care providers about their family history of breast cancer, in order to determine what breast cancer screening may be most appropriate for them.  Since the breast cancers in the family have been diagnosed at later than age 60, there is not an indication for Lydia's daughter (or other close female relatives) to start breast cancer screening before age 40 based on family history alone.      Other population cancer screening options, such as those recommended by the American Cancer Society and the National Comprehensive Cancer Network (NCCN), are also appropriate for Lydia and her family. These screening recommendations may change if there are changes to Lydia's personal and/or family history of cancer. Final screening recommendations should be made by each individual's primary care provider.    Additional Testing Considerations:  It is possible Lydia does carry a gene or combination of genes and environment that increase her risk for cancer that was not identifiable through this particular genetic testing panel. Lydia is encouraged to contact me in the future if she wants to know if there is additional genetic testing that might be available/indicated for her then or if she wishes to readdress larger gene panel options in the future.     Summary:  We do not have an explanation for Lydia's personal and family history of breast cancer. While no genetic changes were identified, Lydia may still be at  "risk for certain cancers due to family history, environmental factors, or other genetic causes not identified by this test.  Because of that, it is important that she continue with cancer screening based on her personal and family history as discussed above.    Genetic testing is rapidly advancing, and new cancer susceptibility genes will most likely be identified in the future. Therefore, I encouraged Lydia to contact me annually or if there are changes in her personal or family history. This may change how we assess her cancer risk, screening, and the testing we would offer.    Plan:  1.  I released a copy of Lydia's genetic testing report to her through the [employer] portal today.  She will also get a copy of this clinic note and a handout about a \"variant of unknown significance\" result (see \"patient instructions\").  2.  Lydia plans to follow-up with her oncology team and her primary care provider, as previously recommended.  3.  Lydia should contact me periodically, or if her family history changes, and she would like to know if there are additional screening or testing recommendations at that time.  4.  I will try to contact Lydia if the laboratory informs me that this VUS has been reclassified.  This may change screening and testing recommendations for Lydia and her relatives.    If Lydia has any further questions, I encouraged her to contact me via [employer] or through email: [email or web address].    Alicia Chacon MS, [facility]  Genetic Counselor    Time spent over the phone: 8 minutes      "

## 2022-10-04 ENCOUNTER — OFFICE VISIT (OUTPATIENT)
Dept: SURGERY | Facility: CLINIC | Age: 72
End: 2022-10-04
Payer: MEDICARE

## 2022-10-04 VITALS
DIASTOLIC BLOOD PRESSURE: 72 MMHG | SYSTOLIC BLOOD PRESSURE: 118 MMHG | HEART RATE: 78 BPM | RESPIRATION RATE: 16 BRPM | OXYGEN SATURATION: 93 % | HEIGHT: 61 IN | BODY MASS INDEX: 33.99 KG/M2 | WEIGHT: 180 LBS

## 2022-10-04 DIAGNOSIS — D17.30 LIPOMA OF SKIN AND SUBCUTANEOUS TISSUE: Primary | ICD-10-CM

## 2022-10-04 PROCEDURE — 99212 OFFICE O/P EST SF 10 MIN: CPT | Performed by: SURGERY

## 2022-10-04 RX ORDER — GABAPENTIN 300 MG/1
300 CAPSULE ORAL AT BEDTIME
COMMUNITY
Start: 2022-09-22

## 2022-10-04 NOTE — LETTER
"2022    RE: Lydia Ac, : 1950      Assessment:    Lydia Ac is a 72 year old female with a left forearm dermal mass most consistent with a lipoma     Plan:  I have offered Lydia excision in the office with local anesthesia.  She elects to proceed with excision and will schedule at her convenience.      HPI:  Lydia Ac is a 72 year old female who presents today in consultation for subcutaneous mass on her left forearm   Has been there for quite some time and initially was not a problem, but other people are noticing it more now, it sticks out.  It is not painful or bothersome otherwise.     Allergies:  No Known Allergies    ROS:  The 10 point review of systems is negative other than noted in the HPI and above.     PE:  /72   Pulse 78   Resp 16   Ht 1.549 m (5' 1\")   Wt 81.6 kg (180 lb)   SpO2 93%   BMI 34.01 kg/m    General appearance: well-nourished, no apparent distress  Skin: there is a 1cm somewhat firm but mobile subcutaneous nodule on the left medial/ventral mid forearm        Chrissy Bernal MD       "

## 2022-10-04 NOTE — PROGRESS NOTES
Assessment:    Lydia Ac is a 72 year old female with a left forearm dermal mass most consistent with a lipoma    Plan:  I have offered Lydia excision in the office with local anesthesia.  She elects to proceed with excision and will schedule at her convenience.                    15 minutes spent on the date of the encounter doing chart review, history and exam, documentation and further activities as noted above        HPI:  Lydia Ac is a 72 year old female who presents today in consultation for subcutaneous mass on her left forearm   Has been there for quite some time and initially was not a problem, but other people are noticing it more now, it sticks out.  It is not painful or bothersome otherwise.         PMH:  Past Medical History:   Diagnosis Date     Anxiety      Arthritis     Knees     H/O hyperlipidemia      Hx of migraine headaches      Left bundle branch block        PSH:  Past Surgical History:   Procedure Laterality Date     BREAST SURGERY      biopsy x3 (benign)     LUMPECTOMY BREAST Left 12/15/2021    Procedure: Re-excision of Left lumpectomy margins;  Surgeon: Jennifer Rodriguez MD;  Location: [facility] OR     LUMPECTOMY BREAST WITH SEED LOCALIZATION Left 11/22/2021    Procedure: LUMPECTOMY, BREAST, WITH RADIOACTIVE SEED LOCALIZATION left;  Surgeon: Jennifer Rodriguez MD;  Location: [facility] OR     ORTHOPEDIC SURGERY Left     bunionectomy     right rotator cuff repair         Allergies:  No Known Allergies    Home Medications:  Current Outpatient Medications   Medication Sig Dispense Refill     anastrozole (ARIMIDEX) 1 MG tablet Take 1 tablet (1 mg) by mouth daily 90 tablet 3     citalopram (CELEXA) 20 MG tablet Take 20 mg by mouth daily        Cyanocobalamin (VITAMIN B 12 PO)        gabapentin (NEURONTIN) 300 MG capsule Take 300 mg by mouth At Bedtime       rosuvastatin (CRESTOR) 5 MG tablet 5 mg daily        SUMAtriptan (IMITREX) 100 MG tablet 100 mg        traZODone (DESYREL) 50 MG tablet  "Take 50 mg by mouth At Bedtime         Social History:  Social History     Tobacco Use     Smoking status: Never Smoker     Smokeless tobacco: Never Used   Substance Use Topics     Alcohol use: Yes     Comment: occasional      Drug use: No         Family History:  No family history on file.      ROS:  The 10 point review of systems is negative other than noted in the HPI and above.    PE:  /72   Pulse 78   Resp 16   Ht 1.549 m (5' 1\")   Wt 81.6 kg (180 lb)   SpO2 93%   BMI 34.01 kg/m    General appearance: well-nourished, no apparent distress  Skin: there is a 1cm somewhat firm but mobile subcutaneous nodule on the left medial/ventral mid forearm       Chrissy Bernal MD          Please route or send letter to:  *None*      "

## 2022-12-12 DIAGNOSIS — C50.912 MALIGNANT NEOPLASM OF LEFT FEMALE BREAST, UNSPECIFIED ESTROGEN RECEPTOR STATUS, UNSPECIFIED SITE OF BREAST (H): ICD-10-CM

## 2022-12-12 RX ORDER — ANASTROZOLE 1 MG/1
1 TABLET ORAL DAILY
Qty: 90 TABLET | Refills: 3 | Status: SHIPPED | OUTPATIENT
Start: 2022-12-12 | End: 2023-04-26

## 2022-12-12 NOTE — TELEPHONE ENCOUNTER
Signed Prescriptions:                        Disp   Refills    anastrozole (ARIMIDEX) 1 MG tablet         90 tab*3        Sig: Take 1 tablet (1 mg) by mouth daily  Authorizing Provider: AFRICA COVINGTON, RN, BSN, OCN  Nurse Care Coordinator  [facility] [place]  P: [phone]     F: [phone]

## 2022-12-12 NOTE — TELEPHONE ENCOUNTER
Pending Prescriptions:                       Disp   Refills    anastrozole (ARIMIDEX) 1 MG tablet        90 tab*3            Sig: Take 1 tablet (1 mg) by mouth daily          Last Written Prescription Date:  1/12/22  Last Fill Quantity: 90,   # refills: 3  Last Office Visit: 5/11/22  Future Office visit:    Next 5 appointments (look out 90 days)    Dec 14, 2022 10:00 AM  Return Visit with Alma Harris MD  [facility] ([facility] ) [address]  [address]  [facility] [address]  [phone]           Routing refill request to provider for review/approval.    Mahnaz Lopez, RN, BSN, OCN  Nurse Care Coordinator  [facility] [place]  P: [phone]     F: [phone]

## 2022-12-14 ENCOUNTER — VIRTUAL VISIT (OUTPATIENT)
Dept: ONCOLOGY | Facility: CLINIC | Age: 72
End: 2022-12-14
Attending: INTERNAL MEDICINE
Payer: MEDICARE

## 2022-12-14 DIAGNOSIS — Z80.3 FAMILY HISTORY OF MALIGNANT NEOPLASM OF BREAST: ICD-10-CM

## 2022-12-14 DIAGNOSIS — C50.912 MALIGNANT NEOPLASM OF LEFT FEMALE BREAST, UNSPECIFIED ESTROGEN RECEPTOR STATUS, UNSPECIFIED SITE OF BREAST (H): ICD-10-CM

## 2022-12-14 DIAGNOSIS — Z12.31 ENCOUNTER FOR SCREENING MAMMOGRAM FOR MALIGNANT NEOPLASM OF BREAST: ICD-10-CM

## 2022-12-14 DIAGNOSIS — C50.912 MALIGNANT NEOPLASM OF LEFT FEMALE BREAST, UNSPECIFIED ESTROGEN RECEPTOR STATUS, UNSPECIFIED SITE OF BREAST (H): Primary | ICD-10-CM

## 2022-12-14 LAB
ALBUMIN SERPL BCG-MCNC: 4.6 G/DL (ref 3.5–5.2)
ALP SERPL-CCNC: 87 U/L (ref 35–104)
ALT SERPL W P-5'-P-CCNC: 20 U/L (ref 10–35)
ANION GAP SERPL CALCULATED.3IONS-SCNC: 9 MMOL/L (ref 7–15)
AST SERPL W P-5'-P-CCNC: 23 U/L (ref 10–35)
BILIRUB SERPL-MCNC: 0.4 MG/DL
BUN SERPL-MCNC: 12.1 MG/DL (ref 8–23)
CALCIUM SERPL-MCNC: 9.8 MG/DL (ref 8.8–10.2)
CHLORIDE SERPL-SCNC: 107 MMOL/L (ref 98–107)
CREAT SERPL-MCNC: 0.65 MG/DL (ref 0.51–0.95)
DEPRECATED HCO3 PLAS-SCNC: 27 MMOL/L (ref 22–29)
ERYTHROCYTE [DISTWIDTH] IN BLOOD BY AUTOMATED COUNT: 13.9 % (ref 10–15)
GFR SERPL CREATININE-BSD FRML MDRD: >90 ML/MIN/1.73M2
GLUCOSE SERPL-MCNC: 95 MG/DL (ref 70–99)
HCT VFR BLD AUTO: 43.4 % (ref 35–47)
HGB BLD-MCNC: 13.6 G/DL (ref 11.7–15.7)
MCH RBC QN AUTO: 30.8 PG (ref 26.5–33)
MCHC RBC AUTO-ENTMCNC: 31.3 G/DL (ref 31.5–36.5)
MCV RBC AUTO: 98 FL (ref 78–100)
PLATELET # BLD AUTO: 208 10E3/UL (ref 150–450)
POTASSIUM SERPL-SCNC: 5.2 MMOL/L (ref 3.4–5.3)
PROT SERPL-MCNC: 6.9 G/DL (ref 6.4–8.3)
RBC # BLD AUTO: 4.41 10E6/UL (ref 3.8–5.2)
SODIUM SERPL-SCNC: 143 MMOL/L (ref 136–145)
WBC # BLD AUTO: 5.7 10E3/UL (ref 4–11)

## 2022-12-14 PROCEDURE — 82040 ASSAY OF SERUM ALBUMIN: CPT | Performed by: INTERNAL MEDICINE

## 2022-12-14 PROCEDURE — 36415 COLL VENOUS BLD VENIPUNCTURE: CPT

## 2022-12-14 PROCEDURE — 80053 COMPREHEN METABOLIC PANEL: CPT | Performed by: INTERNAL MEDICINE

## 2022-12-14 PROCEDURE — 85027 COMPLETE CBC AUTOMATED: CPT | Performed by: INTERNAL MEDICINE

## 2022-12-14 PROCEDURE — 99214 OFFICE O/P EST MOD 30 MIN: CPT | Mod: 95 | Performed by: INTERNAL MEDICINE

## 2022-12-14 NOTE — PROGRESS NOTES
Lydia is a 72 year old who is being evaluated via a billable video visit.      How would you like to obtain your AVS? MyChart  If the video visit is dropped, the invitation should be resent by: Text to cell phone: [phone]  Will anyone else be joining your video visit? Ankita SERRANO          Video-Visit Details

## 2022-12-14 NOTE — LETTER
"    12/14/2022         RE: Lydia Ac  [address]  [place] [address]        Dear Colleague,    Thank you for referring your patient, Lydia Ac, to the [facility]. Please see a copy of my visit note below.    Lydia is a 72 year old who is being evaluated via a billable video visit.      How would you like to obtain your AVS? MyChart  If the video visit is dropped, the invitation should be resent by: Text to cell phone: [phone]  Will anyone else be joining your video visit? No     Grazyna Najera VF    {If patient encounters technical issues they should call [phone] :[id number]}      Video-Visit Details    Video Start Time: {video visit start/end time for provider to select:831716}    Type of service:  Video Visit    Video End Time:{video visit start/end time for provider to select:192972}    Originating Location (pt. Location): {video visit patient location:347063::\"Home\"}    {PROVIDER LOCATION On-site should be selected for visits conducted from your clinic location or adjoining [facility] hospital, academic office, or other nearby [facility] building. Off-site should be selected for all other provider locations, including home:476771}    Distant Location (provider location):  {virtual location provider:332188}    Platform used for Video Visit: {Virtual Visit Platforms:620501::\"[employer]\"}    Visit Date: 12/14/2022    Lydia Ac is a 72-year-old patient who has been evaluated today by a video visit due to public health emergency with coronavirus.  She has given consent.    VIDEO PLATFORM:  [employer].    PATIENT LOCATION:  Home.    PHYSICIAN LOCATION:  [facility].    CHIEF COMPLAINT:     1.  Left-sided DCIS.  2.  Left-sided atypical ductal hyperplasia.    HISTORY OF PRESENTING COMPLAINT:  Lydia is a 72-year-old postmenopausal patient who was diagnosed with left-sided DCIS and atypical ductal hyperplasia.  She had a lumpectomy done in 11/2021, so she is " about a year out from her diagnosis. Because her DCIS was completely excised,  she decided against any radiation therapy and she started on adjuvant anastrozole.  She is actually doing quite well on the anastrozole.  She denies any major symptomatology from it.    FAMILY HISTORY:  Her sister  from lung cancer at age 80.  She also has 1 sister who has scoliosis and heart arrhythmia.    SOCIAL HISTORY:  The patient is a non-cigarette smoker, but she did smoke marijuana for 3 years in her 20s.  The patient lives alone.    PAST MEDICAL HISTORY:  Recently, the patient had a basal cell carcinoma removed from her hairline.  There is nothing else new in her past medical history.      SCREENING:  Her next mammogram will be due in 2023.    PAIN ASSESSMENT:  She is in no pain today.    PHYSICAL EXAMINATION:    GENERAL:  Overall, she looks well.  EYES:  No redness or discharge.  MUSCULOSKELETAL:  She is moving all extremities.  RESPIRATORY:  She has no cough or labored breathing.  NEUROLOGIC:  She is alert and oriented x 3.  PSYCHIATRIC:  Normal.    LABORATORY DATA REVIEW:  I have reviewed her mammogram from 2022 and that was normal.    IMPRESSION:  This is a 72-year-old postmenopausal patient with history of left-sided DCIS and also atypical ductal hyperplasia.  She will continue on active treatment with adjuvant anastrozole.  We will get routine blood work to make sure she is tolerating it.  Her next mammogram is going to be due in 2023.  All of the above has been discussed with her and she is in agreement with the plan.    COMPLEXITY OF CASE TODAY:  Moderate.    Alma Harris MD        D: 12/15/2022   T: 12/15/2022   MT: KATHERIN    Name:     FLAKITA SANDY  MRN:      [id number]        Account:    [id number]   :      1950           Visit Date: 2022     Document: [id number]      Again, thank you for allowing me to participate in the care of your patient.         Sincerely,        Alma Harris MD

## 2022-12-14 NOTE — PROGRESS NOTES
Medical Assistant Note:  Lydia Ac presents today for blood draw.    Patient seen by provider today: Yes: .   present during visit today: Not Applicable.    Concerns: No Concerns.    Procedure:  Lab draw site: left antecub, Needle type: butterfly, Gauge: 23.    Post Assessment:  Labs drawn without difficulty: Yes.    Discharge Plan:  Departure Mode: Ambulatory.    Face to Face Time: 10.    Michelle Gore, CMA

## 2022-12-14 NOTE — LETTER
"    12/14/2022         RE: Lydia Ac  [address]  [place] [address]        Dear Colleague,    Thank you for referring your patient, Lydia Ac, to the [facility]. Please see a copy of my visit note below.    Lydia is a 72 year old who is being evaluated via a billable video visit.      How would you like to obtain your AVS? MyChart  If the video visit is dropped, the invitation should be resent by: Text to cell phone: [phone]  Will anyone else be joining your video visit? No     Grazyna Najera VF    {If patient encounters technical issues they should call [phone] :637280}      Video-Visit Details    Video Start Time: {video visit start/end time for provider to select:428718}    Type of service:  Video Visit    Video End Time:{video visit start/end time for provider to select:969192}    Originating Location (pt. Location): {video visit patient location:587877::\"Home\"}    {PROVIDER LOCATION On-site should be selected for visits conducted from your clinic location or adjoining [facility] hospital, academic office, or other nearby [facility] building. Off-site should be selected for all other provider locations, including home:990827}    Distant Location (provider location):  {virtual location provider:949000}    Platform used for Video Visit: {Virtual Visit Platforms:975352::\"[employer]\"}    Visit Date: 12/14/2022    Lydia Ac is a 72-year-old patient who has been evaluated today by a video visit due to public health emergency with coronavirus.  She has given consent.    VIDEO PLATFORM:  [employer].    PATIENT LOCATION:  Home.    PHYSICIAN LOCATION:  [facility].    CHIEF COMPLAINT:     1.  Left-sided DCIS.  2.  Left-sided atypical ductal hyperplasia.    HISTORY OF PRESENTING COMPLAINT:  Lydia is a 72-year-old postmenopausal patient who was diagnosed with left-sided DCIS and atypical ductal hyperplasia.  She had a lumpectomy done in 11/2021, so she is " about a year out from her diagnosis. Because her DCIS was completely excised,  she decided against any radiation therapy and she started on adjuvant anastrozole.  She is actually doing quite well on the anastrozole.  She denies any major symptomatology from it.    FAMILY HISTORY:  Her sister  from lung cancer at age 80.  She also has 1 sister who has scoliosis and heart arrhythmia.    SOCIAL HISTORY:  The patient is a non-cigarette smoker, but she did smoke marijuana for 3 years in her 20s.  The patient lives alone.    PAST MEDICAL HISTORY:  Recently, the patient had a basal cell carcinoma removed from her hairline.  There is nothing else new in her past medical history.      SCREENING:  Her next mammogram will be due in 2023.    PAIN ASSESSMENT:  She is in no pain today.    PHYSICAL EXAMINATION:    GENERAL:  Overall, she looks well.  EYES:  No redness or discharge.  MUSCULOSKELETAL:  She is moving all extremities.  RESPIRATORY:  She has no cough or labored breathing.  NEUROLOGIC:  She is alert and oriented x 3.  PSYCHIATRIC:  Normal.    LABORATORY DATA REVIEW:  I have reviewed her mammogram from 2022 and that was normal.    IMPRESSION:  This is a 72-year-old postmenopausal patient with history of left-sided DCIS and also atypical ductal hyperplasia.  She will continue on active treatment with adjuvant anastrozole.  We will get routine blood work to make sure she is tolerating it.  Her next mammogram is going to be due in 2023.  All of the above has been discussed with her and she is in agreement with the plan.    COMPLEXITY OF CASE TODAY:  Moderate.    Alma Harris MD        D: 12/15/2022   T: 12/15/2022   MT: KATHERIN    Name:     FLAKITA SANDY  MRN:      [id number]        Account:    [id number]   :      1950           Visit Date: 2022     Document: [id number]      Again, thank you for allowing me to participate in the care of your patient.         Sincerely,        Alma Harris MD

## 2022-12-15 NOTE — PROGRESS NOTES
Visit Date: 2022    Lydia Ac is a 72-year-old patient who has been evaluated today by a video visit due to public health emergency with coronavirus.  She has given consent.    VIDEO PLATFORM:  [employer].    PATIENT LOCATION:  Home.    PHYSICIAN LOCATION:  [facility].    CHIEF COMPLAINT:     1.  Left-sided DCIS.  2.  Left-sided atypical ductal hyperplasia.    HISTORY OF PRESENTING COMPLAINT:  Lydia is a 72-year-old postmenopausal patient who was diagnosed with left-sided DCIS and atypical ductal hyperplasia.  She had a lumpectomy done in 2021, so she is about a year out from her diagnosis. Because her DCIS was completely excised,  she decided against any radiation therapy and she started on adjuvant anastrozole.  She is actually doing quite well on the anastrozole.  She denies any major symptomatology from it.    FAMILY HISTORY:  Her sister  from lung cancer at age 80.  She also has 1 sister who has scoliosis and heart arrhythmia.    SOCIAL HISTORY:  The patient is a non-cigarette smoker, but she did smoke marijuana for 3 years in her 20s.  The patient lives alone.    PAST MEDICAL HISTORY:  Recently, the patient had a basal cell carcinoma removed from her hairline.  There is nothing else new in her past medical history.      SCREENING:  Her next mammogram will be due in 2023.    PAIN ASSESSMENT:  She is in no pain today.    PHYSICAL EXAMINATION:    GENERAL:  Overall, she looks well.  EYES:  No redness or discharge.  MUSCULOSKELETAL:  She is moving all extremities.  RESPIRATORY:  She has no cough or labored breathing.  NEUROLOGIC:  She is alert and oriented x 3.  PSYCHIATRIC:  Normal.    LABORATORY DATA REVIEW:  I have reviewed her mammogram from 2022 and that was normal.    IMPRESSION:  This is a 72-year-old postmenopausal patient with history of left-sided DCIS and also atypical ductal hyperplasia.  She will continue on active treatment with adjuvant anastrozole.  We will get routine  blood work to make sure she is tolerating it.  Her next mammogram is going to be due in 2023.  All of the above has been discussed with her and she is in agreement with the plan.    COMPLEXITY OF CASE TODAY:  Moderate.    Alma Harris MD        D: 12/15/2022   T: 12/15/2022   MT: PAKMT    Name:     FLAKITA SANDY  MRN:      [id number]        Account:    [id number]   :      1950           Visit Date: 2022     Document: [id number]

## 2022-12-19 ENCOUNTER — OFFICE VISIT (OUTPATIENT)
Dept: SURGERY | Facility: CLINIC | Age: 72
End: 2022-12-19
Payer: MEDICARE

## 2022-12-19 VITALS
OXYGEN SATURATION: 97 % | HEIGHT: 61 IN | RESPIRATION RATE: 16 BRPM | WEIGHT: 180 LBS | SYSTOLIC BLOOD PRESSURE: 122 MMHG | DIASTOLIC BLOOD PRESSURE: 74 MMHG | HEART RATE: 80 BPM | BODY MASS INDEX: 33.99 KG/M2

## 2022-12-19 DIAGNOSIS — D17.30 LIPOMA OF SKIN AND SUBCUTANEOUS TISSUE: Primary | ICD-10-CM

## 2022-12-19 PROCEDURE — 99207 PR DROP WITH A PROCEDURE: CPT | Performed by: SURGERY

## 2022-12-19 PROCEDURE — 11401 EXC TR-EXT B9+MARG 0.6-1 CM: CPT | Performed by: SURGERY

## 2022-12-19 PROCEDURE — 88305 TISSUE EXAM BY PATHOLOGIST: CPT | Performed by: PATHOLOGY

## 2022-12-19 NOTE — PROCEDURES
The left forearm was prepped and draped in standard sterile fashion.  The skin overlying the mass was anesthetized with local anesthetic.  An incision was made with a length of 1.0 cm.  The incision was carried into the subcutaneous tissue and the mass was completely excised sharply. The mass was 1 cm in diameter and had a cystic appearance, was then passed off the field as specimen.  The wound was then closed with a single 4-0 Vicryl subcuticular suture and Steristrips.  The patient tolerated the procedure well.  Sponge and needle counts were correct at the end of the case.     Chrissy Bernal MD

## 2023-04-01 ENCOUNTER — HEALTH MAINTENANCE LETTER (OUTPATIENT)
Age: 73
End: 2023-04-01

## 2023-04-18 ENCOUNTER — PATIENT OUTREACH (OUTPATIENT)
Dept: ONCOLOGY | Facility: CLINIC | Age: 73
End: 2023-04-18
Payer: MEDICARE

## 2023-04-19 NOTE — PROGRESS NOTES
Writer returned call to Lydia to discuss Dr. Harirs's recommendations to stop the arimidex 1 week prior to the knee surgery and then to hold the arimidex for about 1 month following knee surgery (or until she has her mobility and activity level back). Writer reviewed with Lydia that she's at a slightly increased risk of blood clots on the arimidex, so it would be prudent to hold the medication while she undergoes surgery and is more sedentary. Lydia verbalized understanding. Follow-up with Dr. Harris scheduled 6/7/23.    Mahnaz Lopez, RN, BSN, OCN  Nurse Care Coordinator  [facility] [place]  P: [phone]     F: [phone]

## 2023-04-26 DIAGNOSIS — C50.912 MALIGNANT NEOPLASM OF LEFT FEMALE BREAST, UNSPECIFIED ESTROGEN RECEPTOR STATUS, UNSPECIFIED SITE OF BREAST (H): ICD-10-CM

## 2023-04-26 RX ORDER — ANASTROZOLE 1 MG/1
1 TABLET ORAL DAILY
Qty: 90 TABLET | Refills: 3 | Status: SHIPPED | OUTPATIENT
Start: 2023-04-26 | End: 2024-03-29

## 2023-04-26 NOTE — TELEPHONE ENCOUNTER
Pending Prescriptions:                       Disp   Refills    anastrozole (ARIMIDEX) 1 MG tablet        90 tab*3            Sig: Take 1 tablet (1 mg) by mouth daily          Last Written Prescription Date:  12/12/22  Last Fill Quantity: 90,   # refills: 3  Last Office Visit: 12/14/22  Future Office visit: 6/7/23 with Dr. Harris      Routing refill request to provider for review/approval.    Mahnaz Lopez, RN, BSN, OCN  Nurse Care Coordinator  [facility] [place]  P: [phone]     F: [phone]

## 2023-04-26 NOTE — TELEPHONE ENCOUNTER
Signed Prescriptions:                        Disp   Refills    anastrozole (ARIMIDEX) 1 MG tablet         90 tab*3        Sig: Take 1 tablet (1 mg) by mouth daily  Authorizing Provider: AFRICA COVINGTON, RN, BSN, OCN  Nurse Care Coordinator  [facility] [place]  P: [phone]     F: [phone]

## 2023-05-22 ENCOUNTER — HOSPITAL ENCOUNTER (OUTPATIENT)
Dept: MAMMOGRAPHY | Facility: CLINIC | Age: 73
Discharge: HOME | End: 2023-05-22
Attending: INTERNAL MEDICINE | Admitting: INTERNAL MEDICINE
Payer: MEDICARE

## 2023-05-22 DIAGNOSIS — C50.912 MALIGNANT NEOPLASM OF LEFT FEMALE BREAST, UNSPECIFIED ESTROGEN RECEPTOR STATUS, UNSPECIFIED SITE OF BREAST (H): ICD-10-CM

## 2023-05-22 DIAGNOSIS — Z12.31 ENCOUNTER FOR SCREENING MAMMOGRAM FOR MALIGNANT NEOPLASM OF BREAST: ICD-10-CM

## 2023-05-22 PROCEDURE — 77067 SCR MAMMO BI INCL CAD: CPT

## 2023-06-07 ENCOUNTER — ONCOLOGY VISIT (OUTPATIENT)
Dept: ONCOLOGY | Facility: CLINIC | Age: 73
End: 2023-06-07
Attending: INTERNAL MEDICINE
Payer: MEDICARE

## 2023-06-07 VITALS
HEIGHT: 61 IN | TEMPERATURE: 97.4 F | BODY MASS INDEX: 34.36 KG/M2 | SYSTOLIC BLOOD PRESSURE: 106 MMHG | DIASTOLIC BLOOD PRESSURE: 72 MMHG | OXYGEN SATURATION: 95 % | WEIGHT: 182 LBS | HEART RATE: 74 BPM | RESPIRATION RATE: 16 BRPM

## 2023-06-07 DIAGNOSIS — M85.88 OSTEOPENIA OF LUMBAR SPINE: ICD-10-CM

## 2023-06-07 DIAGNOSIS — C50.912 MALIGNANT NEOPLASM OF LEFT FEMALE BREAST, UNSPECIFIED ESTROGEN RECEPTOR STATUS, UNSPECIFIED SITE OF BREAST (H): Primary | ICD-10-CM

## 2023-06-07 PROCEDURE — G0463 HOSPITAL OUTPT CLINIC VISIT: HCPCS | Performed by: INTERNAL MEDICINE

## 2023-06-07 PROCEDURE — 99213 OFFICE O/P EST LOW 20 MIN: CPT | Performed by: INTERNAL MEDICINE

## 2023-06-07 ASSESSMENT — PAIN SCALES - GENERAL: PAINLEVEL: NO PAIN (1)

## 2023-06-07 NOTE — NURSING NOTE
"Oncology Rooming Note    June 7, 2023 3:08 PM   Lydia Ac is a 73 year old female who presents for:    Chief Complaint   Patient presents with     Oncology Clinic Visit     Malignant neoplasm of left female breast, unspecified estrogen receptor status, unspecified site of breast      Initial Vitals: /72 (Cuff Size: Adult Regular)   Pulse 74   Temp 97.4  F (36.3  C) (Tympanic)   Resp 16   Ht 1.549 m (5' 1\")   Wt 82.6 kg (182 lb)   SpO2 95%   BMI 34.39 kg/m   Estimated body mass index is 34.39 kg/m  as calculated from the following:    Height as of this encounter: 1.549 m (5' 1\").    Weight as of this encounter: 82.6 kg (182 lb). Body surface area is 1.89 meters squared.  No Pain (1) Comment: Data Unavailable   No LMP recorded. Patient is postmenopausal.  Allergies reviewed: Yes  Medications reviewed: Yes    Medications: Medication refills not needed today.  Pharmacy name entered into [facility]:    [id number] - [place], [place] - [address] [facility] - [place], [place] - [address]    Clinical concerns: f/u    Joan Breen, MARI              "

## 2023-06-07 NOTE — LETTER
2023         RE: Lydia Ac  [address]  [place] [address]        Dear Colleague,    Thank you for referring your patient, Lydia Ac, to the [facility]. Please see a copy of my visit note below.    Lydia is 73 years old here for interim follow up .    CHIEF COMPLAINT:     1.  Left-sided DCIS.  2.  Left-sided atypical ductal hyperplasia.     HISTORY OF PRESENTING COMPLAINT:  Lydia is a 72-year-old postmenopausal patient who was diagnosed with left-sided DCIS and atypical ductal hyperplasia.  She had a lumpectomy done in 2021, so she is about a year out from her diagnosis. Because her DCIS was completely excised,  she decided against any radiation therapy and she started on adjuvant anastrozole.  She is actually doing quite well on the anastrozole.  She denies any major symptomatology from it.  Lydia suffers from some peripheral neuropathy in her feet but apart from that she is tolerating the anastrozole well.  She will be due another bone density in 2024.  She is also going to get a knee replacement done in 2023.  She is got no worrisome symptomatology from my standpoint today.    FAMILY HISTORY:  Her sister  from lung cancer at age 80.  She also has 1 sister who has scoliosis and heart arrhythmia.     SOCIAL HISTORY:  The patient is a non-cigarette smoker, but she did smoke marijuana for 3 years in her 20s.  The patient lives alone.     PAST MEDICAL HISTORY:  Recently, the patient had a basal cell carcinoma removed from her hairline.      meds and Allergies as outlined     On physical exam:    Patient is alert and oriented x3 neurological exam is normal.    Chest clear to auscultation percussion bilaterally    Heart sounds 1 2 normal no added sounds no murmurs  Breast exam normal no palpable masses on either breast.  Bilateral axilla normal.  Abdomen soft and nontender no hepatosplenomegaly no inguinal adenopathy.  Legs  without tenderness or edema  Skin has no rashes or lesions  Psychiatric exam normal  Data review; she is up-to-date on lab work.    Next bone density will be due in February 2024.    Impression and plan:  73-year-old patient with a history of left-sided DCIS and also atypical ductal hyperplasia who is continuing on active treatment with adjuvant anastrozole.  She is tolerating anastrozole well and happy with her progress on that.  She is up-to-date on her mammogram which which was done in May 2023 and I reviewed that today and that was normal her next DEXA scan will be February 2024.  When I see her back in 6 months she will be due for blood work physical exam.  She is in agreement with the above plan..    Alma Harris MD         Again, thank you for allowing me to participate in the care of your patient.        Sincerely,        Alma Harris MD

## 2023-06-07 NOTE — PROGRESS NOTES
Lydia is 73 years old here for interim follow up .    CHIEF COMPLAINT:     1.  Left-sided DCIS.  2.  Left-sided atypical ductal hyperplasia.     HISTORY OF PRESENTING COMPLAINT:  Lydia is a 72-year-old postmenopausal patient who was diagnosed with left-sided DCIS and atypical ductal hyperplasia.  She had a lumpectomy done in 2021, so she is about a year out from her diagnosis. Because her DCIS was completely excised,  she decided against any radiation therapy and she started on adjuvant anastrozole.  She is actually doing quite well on the anastrozole.  She denies any major symptomatology from it.  Lydia suffers from some peripheral neuropathy in her feet but apart from that she is tolerating the anastrozole well.  She will be due another bone density in 2024.  She is also going to get a knee replacement done in 2023.  She is got no worrisome symptomatology from my standpoint today.    FAMILY HISTORY:  Her sister  from lung cancer at age 80.  She also has 1 sister who has scoliosis and heart arrhythmia.     SOCIAL HISTORY:  The patient is a non-cigarette smoker, but she did smoke marijuana for 3 years in her 20s.  The patient lives alone.     PAST MEDICAL HISTORY:  Recently, the patient had a basal cell carcinoma removed from her hairline.      meds and Allergies as outlined     On physical exam:    Patient is alert and oriented x3 neurological exam is normal.    Chest clear to auscultation percussion bilaterally    Heart sounds 1 2 normal no added sounds no murmurs  Breast exam normal no palpable masses on either breast.  Bilateral axilla normal.  Abdomen soft and nontender no hepatosplenomegaly no inguinal adenopathy.  Legs without tenderness or edema  Skin has no rashes or lesions  Psychiatric exam normal  Data review; she is up-to-date on lab work.    Next bone density will be due in 2024.    Impression and plan:  73-year-old patient with a history of left-sided DCIS and also  atypical ductal hyperplasia who is continuing on active treatment with adjuvant anastrozole.  She is tolerating anastrozole well and happy with her progress on that.  She is up-to-date on her mammogram which which was done in May 2023 and I reviewed that today and that was normal her next DEXA scan will be February 2024.  When I see her back in 6 months she will be due for blood work physical exam.  She is in agreement with the above plan..    Alma Harris MD

## 2023-12-19 ENCOUNTER — LAB (OUTPATIENT)
Dept: ONCOLOGY | Facility: CLINIC | Age: 73
End: 2023-12-19
Attending: INTERNAL MEDICINE
Payer: MEDICARE

## 2023-12-19 VITALS
OXYGEN SATURATION: 99 % | DIASTOLIC BLOOD PRESSURE: 76 MMHG | SYSTOLIC BLOOD PRESSURE: 117 MMHG | HEIGHT: 61 IN | BODY MASS INDEX: 35.17 KG/M2 | WEIGHT: 186.3 LBS | RESPIRATION RATE: 12 BRPM | HEART RATE: 68 BPM | TEMPERATURE: 97.6 F

## 2023-12-19 DIAGNOSIS — M81.0 AGE-RELATED OSTEOPOROSIS WITHOUT CURRENT PATHOLOGICAL FRACTURE: ICD-10-CM

## 2023-12-19 DIAGNOSIS — Z12.31 ENCOUNTER FOR SCREENING MAMMOGRAM FOR MALIGNANT NEOPLASM OF BREAST: ICD-10-CM

## 2023-12-19 DIAGNOSIS — C50.912 MALIGNANT NEOPLASM OF LEFT FEMALE BREAST, UNSPECIFIED ESTROGEN RECEPTOR STATUS, UNSPECIFIED SITE OF BREAST (H): ICD-10-CM

## 2023-12-19 DIAGNOSIS — C50.912 MALIGNANT NEOPLASM OF LEFT FEMALE BREAST, UNSPECIFIED ESTROGEN RECEPTOR STATUS, UNSPECIFIED SITE OF BREAST (H): Primary | ICD-10-CM

## 2023-12-19 LAB
ALBUMIN SERPL BCG-MCNC: 4.4 G/DL (ref 3.5–5.2)
ALP SERPL-CCNC: 88 U/L (ref 40–150)
ALT SERPL W P-5'-P-CCNC: 15 U/L (ref 0–50)
ANION GAP SERPL CALCULATED.3IONS-SCNC: 8 MMOL/L (ref 7–15)
AST SERPL W P-5'-P-CCNC: 22 U/L (ref 0–45)
BILIRUB SERPL-MCNC: 0.4 MG/DL
BUN SERPL-MCNC: 9.4 MG/DL (ref 8–23)
CALCIUM SERPL-MCNC: 9.1 MG/DL (ref 8.8–10.2)
CHLORIDE SERPL-SCNC: 108 MMOL/L (ref 98–107)
CREAT SERPL-MCNC: 0.73 MG/DL (ref 0.51–0.95)
DEPRECATED HCO3 PLAS-SCNC: 28 MMOL/L (ref 22–29)
EGFRCR SERPLBLD CKD-EPI 2021: 87 ML/MIN/1.73M2
ERYTHROCYTE [DISTWIDTH] IN BLOOD BY AUTOMATED COUNT: 13.7 % (ref 10–15)
GLUCOSE SERPL-MCNC: 104 MG/DL (ref 70–99)
HCT VFR BLD AUTO: 38.1 % (ref 35–47)
HGB BLD-MCNC: 12.5 G/DL (ref 11.7–15.7)
MCH RBC QN AUTO: 31.1 PG (ref 26.5–33)
MCHC RBC AUTO-ENTMCNC: 32.8 G/DL (ref 31.5–36.5)
MCV RBC AUTO: 95 FL (ref 78–100)
PLATELET # BLD AUTO: 170 10E3/UL (ref 150–450)
POTASSIUM SERPL-SCNC: 5.1 MMOL/L (ref 3.4–5.3)
PROT SERPL-MCNC: 6.6 G/DL (ref 6.4–8.3)
RBC # BLD AUTO: 4.02 10E6/UL (ref 3.8–5.2)
SODIUM SERPL-SCNC: 144 MMOL/L (ref 135–145)
WBC # BLD AUTO: 4.8 10E3/UL (ref 4–11)

## 2023-12-19 PROCEDURE — G0463 HOSPITAL OUTPT CLINIC VISIT: HCPCS | Performed by: INTERNAL MEDICINE

## 2023-12-19 PROCEDURE — 85027 COMPLETE CBC AUTOMATED: CPT | Performed by: INTERNAL MEDICINE

## 2023-12-19 PROCEDURE — 36415 COLL VENOUS BLD VENIPUNCTURE: CPT

## 2023-12-19 PROCEDURE — 99214 OFFICE O/P EST MOD 30 MIN: CPT | Performed by: INTERNAL MEDICINE

## 2023-12-19 PROCEDURE — 80053 COMPREHEN METABOLIC PANEL: CPT | Performed by: INTERNAL MEDICINE

## 2023-12-19 RX ORDER — CLOBETASOL PROPIONATE 0.5 MG/ML
SOLUTION TOPICAL
COMMUNITY
Start: 2023-12-14

## 2023-12-19 NOTE — PROGRESS NOTES
Lydia is 73 years old here for interim follow up .     CHIEF COMPLAINT:     1.  Left-sided DCIS.  2.  Left-sided atypical ductal hyperplasia.     HISTORY OF PRESENTING COMPLAINT:      Lydia is a 73-year-old postmenopausal patient who was diagnosed with left-sided DCIS and atypical ductal hyperplasia.  She had a lumpectomy done in 2021, so she is about a year out from her diagnosis. Because her DCIS was completely excised,  she decided against any radiation therapy and she started on adjuvant anastrozole.  She is actually doing quite well on the anastrozole.  She denies any major symptomatology from it.  Lydia suffers from some peripheral neuropathy in her feet but apart from that she is tolerating the anastrozole well.  She will be due another bone density in 2024  .  Overall the patient is feeling well today without any major problems.  She just got back from a vacation in [facility] and will go again in 2024.  She denies cough shortness of breath bone pain or worrisome symptomatology from my standpoint.          FAMILY HISTORY:  Her sister  from lung cancer at age 80.  She also has 1 sister who has scoliosis and heart arrhythmia.     SOCIAL HISTORY:  The patient is a non-cigarette smoker, but she did smoke marijuana for 3 years in her 20s.  The patient lives alone.     PAST MEDICAL HISTORY:  Recently, the patient had a basal cell carcinoma removed from her hairline.  She had a knee replacement done in 2023     meds and Allergies as outlined     12 point comprehensive review of systems otherwise unremarkable      On physical exam:  She well-appearing lady no acute distress.  She is alert and orientated x 3  Neck is no masses or lymph nodes  Respiratory exam normal  Cardiovascular exam normal  Breast exam: Patient is status post left-sided lumpectomy the scar looks good there are no palpable masses left axilla negative  Right breast no palpable masses right axilla negative  GI exam  normal  Psychiatric exam normal  Legs without tenderness or edema        Data review:  I have reviewed her mammogram that was done in May 2023 and that was normal    Labs done today show normal liver function tests and normal electrolytes.  Creatinine 0.73  White cell count 4.8, hemoglobin 12.5, platelets 170      Impression and plan    Lydia is a 73-year-old postmenopausal patient she has got a diagnosis of left-sided atypical ductal hyperplasia as well as left-sided DCIS she status post lumpectomy and on active treatment with adjuvant anastrozole.  She will be due for another mammogram in May 2024.  She will also be due for DEXA scan in 2024.  Her overall health is good today and I will see her back in 6 months for blood work and physical exam.  Patient is in agreement with the above plan.      Alma Harris MD

## 2023-12-19 NOTE — LETTER
2023         RE: Lydia Ac  [address]  [place] [address]        Dear Colleague,    Thank you for referring your patient, Lydia Ac, to the [facility]. Please see a copy of my visit note below.    Lydia is 73 years old here for interim follow up .     CHIEF COMPLAINT:     1.  Left-sided DCIS.  2.  Left-sided atypical ductal hyperplasia.     HISTORY OF PRESENTING COMPLAINT:      Lydia is a 73-year-old postmenopausal patient who was diagnosed with left-sided DCIS and atypical ductal hyperplasia.  She had a lumpectomy done in 2021, so she is about a year out from her diagnosis. Because her DCIS was completely excised,  she decided against any radiation therapy and she started on adjuvant anastrozole.  She is actually doing quite well on the anastrozole.  She denies any major symptomatology from it.  Lydia suffers from some peripheral neuropathy in her feet but apart from that she is tolerating the anastrozole well.  She will be due another bone density in 2024  .  Overall the patient is feeling well today without any major problems.  She just got back from a vacation in [facility] and will go again in 2024.  She denies cough shortness of breath bone pain or worrisome symptomatology from my standpoint.          FAMILY HISTORY:  Her sister  from lung cancer at age 80.  She also has 1 sister who has scoliosis and heart arrhythmia.     SOCIAL HISTORY:  The patient is a non-cigarette smoker, but she did smoke marijuana for 3 years in her 20s.  The patient lives alone.     PAST MEDICAL HISTORY:  Recently, the patient had a basal cell carcinoma removed from her hairline.  She had a knee replacement done in 2023     meds and Allergies as outlined     12 point comprehensive review of systems otherwise unremarkable      On physical exam:  She well-appearing lady no acute distress.  She is alert and orientated x 3  Neck is  no masses or lymph nodes  Respiratory exam normal  Cardiovascular exam normal  Breast exam: Patient is status post left-sided lumpectomy the scar looks good there are no palpable masses left axilla negative  Right breast no palpable masses right axilla negative  GI exam normal  Psychiatric exam normal  Legs without tenderness or edema        Data review:  I have reviewed her mammogram that was done in May 2023 and that was normal    Labs done today show normal liver function tests and normal electrolytes.  Creatinine 0.73  White cell count 4.8, hemoglobin 12.5, platelets 170      Impression and plan    Lydia is a 73-year-old postmenopausal patient she has got a diagnosis of left-sided atypical ductal hyperplasia as well as left-sided DCIS she status post lumpectomy and on active treatment with adjuvant anastrozole.  She will be due for another mammogram in May 2024.  She will also be due for DEXA scan in 2024.  Her overall health is good today and I will see her back in 6 months for blood work and physical exam.  Patient is in agreement with the above plan.      Alam Harris MD                Again, thank you for allowing me to participate in the care of your patient.        Sincerely,        Alma Harris MD

## 2023-12-19 NOTE — NURSING NOTE
"Oncology Rooming Note    December 19, 2023 9:32 AM   Lydia Ac is a 73 year old female who presents for:    Chief Complaint   Patient presents with    Oncology Clinic Visit     Breast cancer screening, high risk patient       Initial Vitals: /76   Pulse 68   Temp 97.6  F (36.4  C) (Oral)   Resp 12   Ht 1.549 m (5' 0.98\")   Wt 84.5 kg (186 lb 4.8 oz)   SpO2 99%   BMI 35.22 kg/m   Estimated body mass index is 35.22 kg/m  as calculated from the following:    Height as of this encounter: 1.549 m (5' 0.98\").    Weight as of this encounter: 84.5 kg (186 lb 4.8 oz). Body surface area is 1.91 meters squared.  Data Unavailable Comment: Data Unavailable   No LMP recorded. Patient is postmenopausal.  Allergies reviewed: Yes  Medications reviewed: Yes    Medications: Medication refills not needed today.  Pharmacy name entered into [facility]:    [id number] [place] [facility] [place] [facility] [address] [facility] - [place], [place] - [address]    Clinical concerns: follow up       Maricruz Rosales MA              "

## 2023-12-19 NOTE — PROGRESS NOTES
Medical Assistant Note:  Lydia Ac presents today for blood draw.    Patient seen by provider today: Yes: .   present during visit today: Not Applicable.    Concerns: No Concerns.    Procedure:  Lab draw site: right antecub, Needle type: butterfly, Gauge: 23.    Post Assessment:  Labs drawn without difficulty: Yes.    Discharge Plan:  Departure Mode: Ambulatory.    Face to Face Time: 10.    Michelle Gore, CMA

## 2024-03-29 DIAGNOSIS — C50.912 MALIGNANT NEOPLASM OF LEFT FEMALE BREAST, UNSPECIFIED ESTROGEN RECEPTOR STATUS, UNSPECIFIED SITE OF BREAST (H): ICD-10-CM

## 2024-03-29 RX ORDER — ANASTROZOLE 1 MG/1
1 TABLET ORAL DAILY
Qty: 90 TABLET | Refills: 3 | Status: SHIPPED | OUTPATIENT
Start: 2024-03-29

## 2024-03-29 NOTE — TELEPHONE ENCOUNTER
Pending Prescriptions:                       Disp   Refills    anastrozole (ARIMIDEX) 1 MG tablet        90 tab*3            Sig: Take 1 tablet (1 mg) by mouth daily          Last Written Prescription Date:  4/26/23  Last Fill Quantity: 90,   # refills: 3  Last Office Visit: 12/19/23 with Dr. Harris  Future Office visit:  6/25/24 with Dr. Harris     Routing refill request to provider for review/approval.    Mahnaz Lopez, RN, BSN, OCN, CBCN  Nurse Care Coordinator  [facility]  P: [phone]     F: [phone]

## 2024-03-29 NOTE — TELEPHONE ENCOUNTER
Signed Prescriptions:                        Disp   Refills    anastrozole (ARIMIDEX) 1 MG tablet         90 tab*3        Sig: Take 1 tablet (1 mg) by mouth daily  Authorizing Provider: MARKIE BALBUENA, RN, BSN, OCN, CBCN  Nurse Care Coordinator  [facility] [place]  P: [phone]     F: [phone]

## 2024-05-07 LAB — SCANNED LAB RESULT: NORMAL

## 2024-05-23 ENCOUNTER — HOSPITAL ENCOUNTER (OUTPATIENT)
Dept: MAMMOGRAPHY | Facility: CLINIC | Age: 74
Discharge: HOME | End: 2024-05-23
Attending: INTERNAL MEDICINE
Payer: MEDICARE

## 2024-05-23 ENCOUNTER — HOSPITAL ENCOUNTER (OUTPATIENT)
Dept: BONE DENSITY | Facility: CLINIC | Age: 74
Discharge: HOME | End: 2024-05-23
Attending: INTERNAL MEDICINE
Payer: MEDICARE

## 2024-05-23 DIAGNOSIS — Z12.31 ENCOUNTER FOR SCREENING MAMMOGRAM FOR MALIGNANT NEOPLASM OF BREAST: ICD-10-CM

## 2024-05-23 DIAGNOSIS — M81.0 AGE-RELATED OSTEOPOROSIS WITHOUT CURRENT PATHOLOGICAL FRACTURE: ICD-10-CM

## 2024-05-23 DIAGNOSIS — C50.912 MALIGNANT NEOPLASM OF LEFT FEMALE BREAST, UNSPECIFIED ESTROGEN RECEPTOR STATUS, UNSPECIFIED SITE OF BREAST (H): ICD-10-CM

## 2024-05-23 PROCEDURE — 77063 BREAST TOMOSYNTHESIS BI: CPT

## 2024-05-23 PROCEDURE — 77080 DXA BONE DENSITY AXIAL: CPT

## 2024-06-01 ENCOUNTER — HEALTH MAINTENANCE LETTER (OUTPATIENT)
Age: 74
End: 2024-06-01

## 2024-06-25 ENCOUNTER — ONCOLOGY VISIT (OUTPATIENT)
Dept: ONCOLOGY | Facility: CLINIC | Age: 74
End: 2024-06-25
Attending: INTERNAL MEDICINE
Payer: MEDICARE

## 2024-06-25 VITALS
HEART RATE: 76 BPM | HEIGHT: 61 IN | BODY MASS INDEX: 35.13 KG/M2 | WEIGHT: 186.1 LBS | OXYGEN SATURATION: 99 % | RESPIRATION RATE: 14 BRPM | TEMPERATURE: 97.3 F | DIASTOLIC BLOOD PRESSURE: 65 MMHG | SYSTOLIC BLOOD PRESSURE: 117 MMHG

## 2024-06-25 DIAGNOSIS — C50.912 MALIGNANT NEOPLASM OF LEFT FEMALE BREAST, UNSPECIFIED ESTROGEN RECEPTOR STATUS, UNSPECIFIED SITE OF BREAST (H): Primary | ICD-10-CM

## 2024-06-25 DIAGNOSIS — Z12.31 ENCOUNTER FOR SCREENING MAMMOGRAM FOR MALIGNANT NEOPLASM OF BREAST: ICD-10-CM

## 2024-06-25 PROCEDURE — G0463 HOSPITAL OUTPT CLINIC VISIT: HCPCS | Performed by: INTERNAL MEDICINE

## 2024-06-25 PROCEDURE — 99213 OFFICE O/P EST LOW 20 MIN: CPT | Performed by: INTERNAL MEDICINE

## 2024-06-25 RX ORDER — CALCIUM CARBONATE/VITAMIN D3 600 MG-10
1 TABLET ORAL 2 TIMES DAILY
COMMUNITY

## 2024-06-25 ASSESSMENT — PAIN SCALES - GENERAL: PAINLEVEL: NO PAIN (1)

## 2024-06-25 NOTE — PROGRESS NOTES
Lydia is 74 years old here for interim follow up .     CHIEF COMPLAINT:     1.  Left-sided DCIS.  2.  Left-sided atypical ductal hyperplasia.     HISTORY OF PRESENTING COMPLAINT:       Lydia is a 74-year-old postmenopausal patient who was diagnosed with left-sided DCIS and atypical ductal hyperplasia.  She had a lumpectomy done in 2021. Because her DCIS was completely excised,  she decided against any radiation therapy and she started on adjuvant anastrozole.  She is actually doing quite well on the anastrozole.  She denies any major symptomatology from it.  Lydia suffers from some peripheral neuropathy in her feet but apart from that she is tolerating the anastrozole well.        Lydia recently had her right knee replaced and she is recovered nicely from that.  She does have an area on the knee that has some numbness and she is hoping that some of that will return.      10 point comprehensive review of systems otherwise unremarkable                FAMILY HISTORY:  Her sister  from lung cancer at age 80.  She also has 1 sister who has scoliosis and heart arrhythmia.     SOCIAL HISTORY:  The patient is a non-cigarette smoker, but she did smoke marijuana for 3 years in her 20s.  The patient lives alone.     PAST MEDICAL HISTORY:      Recently, the patient had a basal cell carcinoma removed from her hairline.  She had a knee replacement done in 2023     meds and Allergies as outlined         On physical exam:  She well-appearing lady no acute distress.  She is alert and orientated x 3  Neck is no masses or lymph nodes  Respiratory exam normal  Cardiovascular exam normal  Breast exam: Patient is status post left-sided lumpectomy the scar looks good there are no palpable masses left axilla negative  Right breast no palpable masses right axilla negative  GI exam normal  Psychiatric exam normal  Legs without tenderness or edema      Data review:    Mammogram done in May 2024 was benign.    I written up  another mammogram for May 2025    Patient is up-to-date and she had lab work done in 12/19/2023.        Impression and plan:       This is 74-year-old patient with a history of  left-sided DCIS as well as left-sided atypical ductal hyperplasia  She is up-to-date on the bone density and she has had some decrease in her bone density which is mild.  She is on calcium and vitamin D.    We will recheck that again in about a year and she may need some bone strengthening agent if she continues to have some decline.      She will continue on yearly visits        Total time spent today 20 minutes      Alma Harris MD

## 2024-06-25 NOTE — LETTER
2024      Lydia Ac  [address]  [place] [address]      Dear Colleague,    Thank you for referring your patient, Lydia Ac, to the [facility] CANCER [facility]. Please see a copy of my visit note below.    Lydia is 74 years old here for interim follow up .     CHIEF COMPLAINT:     1.  Left-sided DCIS.  2.  Left-sided atypical ductal hyperplasia.     HISTORY OF PRESENTING COMPLAINT:       Lydia is a 74-year-old postmenopausal patient who was diagnosed with left-sided DCIS and atypical ductal hyperplasia.  She had a lumpectomy done in 2021. Because her DCIS was completely excised,  she decided against any radiation therapy and she started on adjuvant anastrozole.  She is actually doing quite well on the anastrozole.  She denies any major symptomatology from it.  Lydia suffers from some peripheral neuropathy in her feet but apart from that she is tolerating the anastrozole well.        Lydia recently had her right knee replaced and she is recovered nicely from that.  She does have an area on the knee that has some numbness and she is hoping that some of that will return.      10 point comprehensive review of systems otherwise unremarkable                FAMILY HISTORY:  Her sister  from lung cancer at age 80.  She also has 1 sister who has scoliosis and heart arrhythmia.     SOCIAL HISTORY:  The patient is a non-cigarette smoker, but she did smoke marijuana for 3 years in her 20s.  The patient lives alone.     PAST MEDICAL HISTORY:      Recently, the patient had a basal cell carcinoma removed from her hairline.  She had a knee replacement done in 2023     meds and Allergies as outlined         On physical exam:  She well-appearing lady no acute distress.  She is alert and orientated x 3  Neck is no masses or lymph nodes  Respiratory exam normal  Cardiovascular exam normal  Breast exam: Patient is status post left-sided lumpectomy the scar looks good there  are no palpable masses left axilla negative  Right breast no palpable masses right axilla negative  GI exam normal  Psychiatric exam normal  Legs without tenderness or edema      Data review:    Mammogram done in May 2024 was benign.    I written up another mammogram for May 2025    Patient is up-to-date and she had lab work done in 12/19/2023.        Impression and plan:       This is 74-year-old patient with a history of  left-sided DCIS as well as left-sided atypical ductal hyperplasia  She is up-to-date on the bone density and she has had some decrease in her bone density which is mild.  She is on calcium and vitamin D.    We will recheck that again in about a year and she may need some bone strengthening agent if she continues to have some decline.      She will continue on yearly visits        Total time spent today 20 minutes      Alma Harris MD               Again, thank you for allowing me to participate in the care of your patient.        Sincerely,        Alma Harris MD

## 2024-06-25 NOTE — NURSING NOTE
"Oncology Rooming Note    June 25, 2024 2:31 PM   Lydia Ac is a 74 year old female who presents for:    Chief Complaint   Patient presents with    Oncology Clinic Visit     Breast cancer screening, high risk patient       Initial Vitals: /65   Pulse 76   Temp 97.3  F (36.3  C) (Oral)   Resp 14   Ht 1.549 m (5' 0.98\")   Wt 84.4 kg (186 lb 1.6 oz)   SpO2 99%   BMI 35.18 kg/m   Estimated body mass index is 35.18 kg/m  as calculated from the following:    Height as of this encounter: 1.549 m (5' 0.98\").    Weight as of this encounter: 84.4 kg (186 lb 1.6 oz). Body surface area is 1.91 meters squared.  No Pain (1) Comment: Data Unavailable   No LMP recorded. Patient is postmenopausal.  Allergies reviewed: Yes  Medications reviewed: Yes    Medications: Medication refills not needed today.  Pharmacy name entered into [facility]:    [facility] [place] [place] [facility] [address] [facility] - [place], [place] - [address]    Frailty Screening:   Is the patient here for a new oncology consult visit in cancer care? 2. No      Clinical concerns: Follow up       Maricruz Rosales MA              "

## 2025-01-15 DIAGNOSIS — C50.912 MALIGNANT NEOPLASM OF LEFT FEMALE BREAST, UNSPECIFIED ESTROGEN RECEPTOR STATUS, UNSPECIFIED SITE OF BREAST (H): ICD-10-CM

## 2025-01-15 RX ORDER — ANASTROZOLE 1 MG/1
1 TABLET ORAL DAILY
Qty: 30 TABLET | Refills: 0 | Status: SHIPPED | OUTPATIENT
Start: 2025-01-15

## 2025-01-15 NOTE — CONFIDENTIAL NOTE
[facility]: Cancer Care                                                                                          Patient calling in to report she is seeing Dr. Harris with [facility] for her oncology care. She will have her anastrozole  filled with her moving forward. Patient had no further questions or concerns and thanked writer for the call today.     Ricki Link, RN, BSN.  RN Care Coordinator     [facility]   [phone]

## 2025-01-15 NOTE — CONFIDENTIAL NOTE
Signed Prescriptions:                        Disp   Refills    anastrozole (ARIMIDEX) 1 MG tablet         30 tab*0        Sig: TAKE 1 TABLET EVERY DAY  Authorizing Provider: MARKIE BALBUENA         Anastrozole      Last Written Prescription Date:  3/29/24  Last Fill Quantity: 90,   # refills: 3  Last Office Visit: 6/25/24  Future Office visit: PRN     Routing refill request to provider for review/approval.     Ricki Link, RN, BSN.  RN Care Coordinator     [facility]   [phone]

## 2025-02-05 DIAGNOSIS — C50.912 MALIGNANT NEOPLASM OF LEFT FEMALE BREAST, UNSPECIFIED ESTROGEN RECEPTOR STATUS, UNSPECIFIED SITE OF BREAST (H): ICD-10-CM

## 2025-02-05 RX ORDER — ANASTROZOLE 1 MG/1
1 TABLET ORAL DAILY
Qty: 30 TABLET | Refills: 11 | OUTPATIENT
Start: 2025-02-05

## 2025-02-05 NOTE — TELEPHONE ENCOUNTER
Patient no longer receiving services with our team. As such, refill request refused at this time.     Refused Prescriptions:                       Disp   Refills    anastrozole (ARIMIDEX) 1 MG tablet [Pharma*30 tab*11       Sig: TAKE 1 TABLET EVERY DAY    Yissel Morris RN on 2/5/2025 at 9:28 AM

## 2025-05-28 ENCOUNTER — HOSPITAL ENCOUNTER (OUTPATIENT)
Dept: MAMMOGRAPHY | Facility: CLINIC | Age: 75
Discharge: HOME | End: 2025-05-28
Attending: INTERNAL MEDICINE
Payer: MEDICARE

## 2025-05-28 DIAGNOSIS — Z12.31 ENCOUNTER FOR SCREENING MAMMOGRAM FOR MALIGNANT NEOPLASM OF BREAST: ICD-10-CM

## 2025-05-28 DIAGNOSIS — C50.912 MALIGNANT NEOPLASM OF LEFT FEMALE BREAST, UNSPECIFIED ESTROGEN RECEPTOR STATUS, UNSPECIFIED SITE OF BREAST (H): ICD-10-CM

## 2025-05-28 PROCEDURE — 77063 BREAST TOMOSYNTHESIS BI: CPT

## 2025-06-14 ENCOUNTER — HEALTH MAINTENANCE LETTER (OUTPATIENT)
Age: 75
End: 2025-06-14

## (undated) DEVICE — SU MONOCRYL 3-0 PS-2 27" Y427H

## (undated) DEVICE — LINEN FULL SHEET 5511

## (undated) DEVICE — LINEN TOWEL PACK X10 5473

## (undated) DEVICE — BAG CLEAR TRASH 1.3M 39X33" P4040C

## (undated) DEVICE — PAD CHUX UNDERPAD 30X36" P3036C

## (undated) DEVICE — SYR EAR BULB 3OZ 0035830

## (undated) DEVICE — LINEN TOWEL PACK X5 5464

## (undated) DEVICE — DRSG STERI STRIP 1/2X4" R1547

## (undated) DEVICE — SU SILK 3-0 PS-1 18" 1684G

## (undated) DEVICE — PREP SKIN SCRUB TRAY 4461A

## (undated) DEVICE — LINEN HALF SHEET 5512

## (undated) DEVICE — SOL NACL 0.9% IRRIG 1000ML BOTTLE 2F7124

## (undated) DEVICE — ESU GROUND PAD ADULT W/CORD E7507

## (undated) DEVICE — PREP POVIDONE IODINE SOLUTION 10% 4OZ BOTTLE 29906-004

## (undated) DEVICE — PREP POVIDONE-IODINE 7.5% SCRUB 4OZ BOTTLE MDS093945

## (undated) DEVICE — SUCTION MANIFOLD NEPTUNE 2 SYS 4 PORT 0702-020-000

## (undated) DEVICE — SU VICRYL 3-0 SH 27" UND J416H

## (undated) DEVICE — SU VICRYL 3-0 TIE 12X18" J904T

## (undated) DEVICE — GLOVE PROTEXIS POWDER FREE 6.5 ORTHOPEDIC 2D73ET65

## (undated) DEVICE — DRAPE LAP W/ARMBOARD 29410

## (undated) DEVICE — TUBING SUCTION 12"X1/4" N612

## (undated) DEVICE — PACK MINOR CUSTOM RIDGES SBA32RMRMA

## (undated) DEVICE — TUBING SMOKE EVAC ATTACHMENT E3590

## (undated) DEVICE — GLOVE PROTEXIS BLUE W/NEU-THERA 6.5  2D73EB65

## (undated) DEVICE — SYR 10ML FINGER CONTROL W/O NDL 309695

## (undated) DEVICE — NDL 27GA 1.25" 305136

## (undated) DEVICE — SYR 03ML LL W/O NDL 309657

## (undated) DEVICE — CLIP ETHICON LIGACLIP SM BLUE LT100

## (undated) DEVICE — DRSG TELFA 3X8" 1238

## (undated) RX ORDER — HYDROCODONE BITARTRATE AND ACETAMINOPHEN 5; 325 MG/1; MG/1
TABLET ORAL
Status: DISPENSED
Start: 2021-12-15

## (undated) RX ORDER — FENTANYL CITRATE 50 UG/ML
INJECTION, SOLUTION INTRAMUSCULAR; INTRAVENOUS
Status: DISPENSED
Start: 2021-12-15

## (undated) RX ORDER — LIDOCAINE HYDROCHLORIDE 10 MG/ML
INJECTION, SOLUTION EPIDURAL; INFILTRATION; INTRACAUDAL; PERINEURAL
Status: DISPENSED
Start: 2021-11-22

## (undated) RX ORDER — BUPIVACAINE HYDROCHLORIDE 2.5 MG/ML
INJECTION, SOLUTION EPIDURAL; INFILTRATION; INTRACAUDAL
Status: DISPENSED
Start: 2021-12-15

## (undated) RX ORDER — ONDANSETRON 2 MG/ML
INJECTION INTRAMUSCULAR; INTRAVENOUS
Status: DISPENSED
Start: 2021-11-22

## (undated) RX ORDER — PROPOFOL 10 MG/ML
INJECTION, EMULSION INTRAVENOUS
Status: DISPENSED
Start: 2021-11-22

## (undated) RX ORDER — ONDANSETRON 2 MG/ML
INJECTION INTRAMUSCULAR; INTRAVENOUS
Status: DISPENSED
Start: 2021-12-15

## (undated) RX ORDER — LIDOCAINE HYDROCHLORIDE 10 MG/ML
INJECTION, SOLUTION EPIDURAL; INFILTRATION; INTRACAUDAL; PERINEURAL
Status: DISPENSED
Start: 2021-12-15

## (undated) RX ORDER — BUPIVACAINE HYDROCHLORIDE 2.5 MG/ML
INJECTION, SOLUTION EPIDURAL; INFILTRATION; INTRACAUDAL
Status: DISPENSED
Start: 2021-11-22

## (undated) RX ORDER — CEFAZOLIN SODIUM/WATER 2 G/20 ML
SYRINGE (ML) INTRAVENOUS
Status: DISPENSED
Start: 2021-12-15

## (undated) RX ORDER — CEFAZOLIN SODIUM/WATER 2 G/20 ML
SYRINGE (ML) INTRAVENOUS
Status: DISPENSED
Start: 2021-11-22

## (undated) RX ORDER — FENTANYL CITRATE 50 UG/ML
INJECTION, SOLUTION INTRAMUSCULAR; INTRAVENOUS
Status: DISPENSED
Start: 2021-11-22

## (undated) RX ORDER — DEXAMETHASONE SODIUM PHOSPHATE 4 MG/ML
INJECTION, SOLUTION INTRA-ARTICULAR; INTRALESIONAL; INTRAMUSCULAR; INTRAVENOUS; SOFT TISSUE
Status: DISPENSED
Start: 2021-12-15